# Patient Record
Sex: MALE | Race: WHITE | NOT HISPANIC OR LATINO | URBAN - METROPOLITAN AREA
[De-identification: names, ages, dates, MRNs, and addresses within clinical notes are randomized per-mention and may not be internally consistent; named-entity substitution may affect disease eponyms.]

---

## 2021-04-06 DIAGNOSIS — Z23 ENCOUNTER FOR IMMUNIZATION: ICD-10-CM

## 2021-11-16 ENCOUNTER — IMMUNIZATIONS (OUTPATIENT)
Dept: FAMILY MEDICINE CLINIC | Facility: HOSPITAL | Age: 56
End: 2021-11-16

## 2021-11-16 DIAGNOSIS — Z23 ENCOUNTER FOR IMMUNIZATION: Primary | ICD-10-CM

## 2021-11-16 PROCEDURE — 91300 COVID-19 PFIZER VACC 0.3 ML: CPT

## 2021-11-16 PROCEDURE — 0001A COVID-19 PFIZER VACC 0.3 ML: CPT

## 2022-09-20 ENCOUNTER — APPOINTMENT (OUTPATIENT)
Dept: RADIOLOGY | Age: 57
End: 2022-09-20
Payer: COMMERCIAL

## 2022-09-20 ENCOUNTER — OFFICE VISIT (OUTPATIENT)
Dept: INTERNAL MEDICINE CLINIC | Age: 57
End: 2022-09-20
Payer: COMMERCIAL

## 2022-09-20 VITALS
SYSTOLIC BLOOD PRESSURE: 112 MMHG | OXYGEN SATURATION: 98 % | WEIGHT: 179 LBS | BODY MASS INDEX: 25.62 KG/M2 | DIASTOLIC BLOOD PRESSURE: 74 MMHG | TEMPERATURE: 98 F | HEART RATE: 65 BPM | HEIGHT: 70 IN

## 2022-09-20 DIAGNOSIS — Z00.00 ROUTINE HEALTH MAINTENANCE: ICD-10-CM

## 2022-09-20 DIAGNOSIS — Z12.5 PROSTATE CANCER SCREENING: ICD-10-CM

## 2022-09-20 DIAGNOSIS — Z12.11 ENCOUNTER FOR SCREENING COLONOSCOPY: Primary | ICD-10-CM

## 2022-09-20 DIAGNOSIS — M25.512 CHRONIC LEFT SHOULDER PAIN: ICD-10-CM

## 2022-09-20 DIAGNOSIS — G89.29 CHRONIC LEFT SHOULDER PAIN: ICD-10-CM

## 2022-09-20 DIAGNOSIS — M75.82 ROTATOR CUFF TENDINITIS, LEFT: ICD-10-CM

## 2022-09-20 PROCEDURE — 3725F SCREEN DEPRESSION PERFORMED: CPT | Performed by: INTERNAL MEDICINE

## 2022-09-20 PROCEDURE — 99203 OFFICE O/P NEW LOW 30 MIN: CPT | Performed by: INTERNAL MEDICINE

## 2022-09-20 PROCEDURE — 73030 X-RAY EXAM OF SHOULDER: CPT

## 2022-09-20 RX ORDER — DIPHENOXYLATE HYDROCHLORIDE AND ATROPINE SULFATE 2.5; .025 MG/1; MG/1
1 TABLET ORAL DAILY
COMMUNITY

## 2022-09-20 NOTE — ASSESSMENT & PLAN NOTE
Will request stat left shoulder x-ray    Physical therapy referral and also advised to take over-the-counter Aleve 2 tablet twice a day for next 4 weeks follow-up in 4 weeks

## 2022-09-20 NOTE — ASSESSMENT & PLAN NOTE
X-ray left shoulder  Two Aleve over-the-counter b i d  Physical therapy referral   And also advised to avoid strenuous exercise on left shoulder

## 2022-09-20 NOTE — PROGRESS NOTES
Assessment/Plan:    Physical therapy referral     Diagnoses and all orders for this visit:    Encounter for screening colonoscopy  -     Ambulatory referral for colonoscopy; Future    Chronic left shoulder pain  -     XR shoulder 2+ vw left; Future  -     Ambulatory Referral to Physical Therapy; Future    Rotator cuff tendinitis, left  -     Ambulatory Referral to Physical Therapy; Future    Prostate cancer screening  -     PSA, Total Screen; Future    Routine health maintenance  -     CBC; Future  -     Lipid Panel with Direct LDL reflex; Future  -     Comprehensive metabolic panel; Future    Other orders  -     multivitamin (THERAGRAN) TABS; Take 1 tablet by mouth daily               Subjective:          Patient ID: Sophia Ramon is a 62 y o  male  Patient came to office with a complain of left shoulder pain over the last 2 months  He use rowing machine at home almost on daily basis  Denied any other trauma  Discomfort is more when he needs to elevate his left arm  No tingling numbness in hand/fingers      The following portions of the patient's history were reviewed and updated as appropriate: allergies, current medications, past family history, past medical history, past social history, past surgical history and problem list     Review of Systems   Constitutional: Negative for fatigue and fever  HENT: Negative for congestion, ear discharge, ear pain, postnasal drip, sinus pressure, sore throat, tinnitus and trouble swallowing  Eyes: Negative for discharge, itching and visual disturbance  Respiratory: Negative for cough and shortness of breath  Cardiovascular: Negative for chest pain and palpitations  Gastrointestinal: Negative for abdominal pain, diarrhea, nausea and vomiting  Endocrine: Negative for cold intolerance and polyuria  Genitourinary: Negative for difficulty urinating, dysuria and urgency  Musculoskeletal: Positive for arthralgias  Negative for neck pain     Skin: Negative for rash    Allergic/Immunologic: Negative for environmental allergies  Neurological: Negative for dizziness, weakness and headaches  Psychiatric/Behavioral: Negative for agitation and behavioral problems  The patient is not nervous/anxious  History reviewed  No pertinent past medical history  Current Outpatient Medications:     multivitamin (THERAGRAN) TABS, Take 1 tablet by mouth daily, Disp: , Rfl:     No Known Allergies    Social History   Past Surgical History:   Procedure Laterality Date    TONSILLECTOMY      childhood    WISDOM TOOTH EXTRACTION      2017     Family History   Problem Relation Age of Onset    Cancer Father         Lung Cancer ( 10/2011)       Objective:  /74 (BP Location: Left arm, Patient Position: Sitting, Cuff Size: Large)   Pulse 65   Temp 98 °F (36 7 °C) (Temporal)   Ht 5' 10" (1 778 m)   Wt 81 2 kg (179 lb)   SpO2 98% Comment: room air  BMI 25 68 kg/m²   Body mass index is 25 68 kg/m²  Physical Exam  Nursing note reviewed  Constitutional:       Appearance: He is well-developed  He is not ill-appearing or diaphoretic  HENT:      Head: Normocephalic  Right Ear: Ear canal and external ear normal       Left Ear: Ear canal and external ear normal       Nose: No rhinorrhea  Mouth/Throat:      Pharynx: No posterior oropharyngeal erythema  Eyes:      General: No scleral icterus  Pupils: Pupils are equal, round, and reactive to light  Neck:      Thyroid: No thyromegaly  Trachea: No tracheal deviation  Cardiovascular:      Rate and Rhythm: Normal rate and regular rhythm  Heart sounds: Normal heart sounds  Pulmonary:      Effort: Pulmonary effort is normal  No respiratory distress  Breath sounds: Normal breath sounds  Chest:      Chest wall: No tenderness  Abdominal:      General: Bowel sounds are normal       Palpations: Abdomen is soft  There is no mass  Tenderness: There is no abdominal tenderness  Musculoskeletal:         General: Tenderness present  Cervical back: Normal range of motion and neck supple  Right lower leg: No edema  Left lower leg: No edema  Comments: Tenderness over left acromioclavicular joints noted  Significant limitation of elevation of left shoulder and limitation of abduction noted  Lymphadenopathy:      Cervical: No cervical adenopathy  Skin:     General: Skin is warm  Neurological:      Mental Status: He is alert and oriented to person, place, and time  Cranial Nerves: No cranial nerve deficit  Psychiatric:         Mood and Affect: Mood normal          Behavior: Behavior normal          Thought Content:  Thought content normal          Judgment: Judgment normal

## 2022-09-26 ENCOUNTER — APPOINTMENT (OUTPATIENT)
Dept: LAB | Facility: AMBULARY SURGERY CENTER | Age: 57
End: 2022-09-26
Payer: COMMERCIAL

## 2022-09-26 DIAGNOSIS — Z12.5 PROSTATE CANCER SCREENING: ICD-10-CM

## 2022-09-26 DIAGNOSIS — Z00.00 ROUTINE HEALTH MAINTENANCE: ICD-10-CM

## 2022-09-26 LAB
ALBUMIN SERPL BCP-MCNC: 3.9 G/DL (ref 3.5–5)
ALP SERPL-CCNC: 35 U/L (ref 46–116)
ALT SERPL W P-5'-P-CCNC: 29 U/L (ref 12–78)
ANION GAP SERPL CALCULATED.3IONS-SCNC: 6 MMOL/L (ref 4–13)
AST SERPL W P-5'-P-CCNC: 20 U/L (ref 5–45)
BILIRUB SERPL-MCNC: 0.97 MG/DL (ref 0.2–1)
BUN SERPL-MCNC: 14 MG/DL (ref 5–25)
CALCIUM SERPL-MCNC: 9.1 MG/DL (ref 8.3–10.1)
CHLORIDE SERPL-SCNC: 106 MMOL/L (ref 96–108)
CHOLEST SERPL-MCNC: 157 MG/DL
CO2 SERPL-SCNC: 26 MMOL/L (ref 21–32)
CREAT SERPL-MCNC: 1.03 MG/DL (ref 0.6–1.3)
ERYTHROCYTE [DISTWIDTH] IN BLOOD BY AUTOMATED COUNT: 12.6 % (ref 11.6–15.1)
GFR SERPL CREATININE-BSD FRML MDRD: 80 ML/MIN/1.73SQ M
GLUCOSE P FAST SERPL-MCNC: 95 MG/DL (ref 65–99)
HCT VFR BLD AUTO: 48.1 % (ref 36.5–49.3)
HDLC SERPL-MCNC: 54 MG/DL
HGB BLD-MCNC: 15.9 G/DL (ref 12–17)
LDLC SERPL CALC-MCNC: 85 MG/DL (ref 0–100)
MCH RBC QN AUTO: 29.9 PG (ref 26.8–34.3)
MCHC RBC AUTO-ENTMCNC: 33.1 G/DL (ref 31.4–37.4)
MCV RBC AUTO: 90 FL (ref 82–98)
PLATELET # BLD AUTO: 220 THOUSANDS/UL (ref 149–390)
PMV BLD AUTO: 9.5 FL (ref 8.9–12.7)
POTASSIUM SERPL-SCNC: 3.5 MMOL/L (ref 3.5–5.3)
PROT SERPL-MCNC: 6.9 G/DL (ref 6.4–8.4)
PSA SERPL-MCNC: 1.9 NG/ML (ref 0–4)
RBC # BLD AUTO: 5.32 MILLION/UL (ref 3.88–5.62)
SODIUM SERPL-SCNC: 138 MMOL/L (ref 135–147)
TRIGL SERPL-MCNC: 90 MG/DL
WBC # BLD AUTO: 6.36 THOUSAND/UL (ref 4.31–10.16)

## 2022-09-26 PROCEDURE — 85027 COMPLETE CBC AUTOMATED: CPT

## 2022-09-26 PROCEDURE — 80061 LIPID PANEL: CPT

## 2022-09-26 PROCEDURE — G0103 PSA SCREENING: HCPCS

## 2022-09-26 PROCEDURE — 36415 COLL VENOUS BLD VENIPUNCTURE: CPT

## 2022-09-26 PROCEDURE — 80053 COMPREHEN METABOLIC PANEL: CPT

## 2022-10-04 ENCOUNTER — EVALUATION (OUTPATIENT)
Dept: PHYSICAL THERAPY | Facility: CLINIC | Age: 57
End: 2022-10-04
Payer: COMMERCIAL

## 2022-10-04 DIAGNOSIS — M25.512 CHRONIC LEFT SHOULDER PAIN: ICD-10-CM

## 2022-10-04 DIAGNOSIS — M75.82 ROTATOR CUFF TENDINITIS, LEFT: ICD-10-CM

## 2022-10-04 DIAGNOSIS — M75.42 SUBACROMIAL IMPINGEMENT OF LEFT SHOULDER: Primary | ICD-10-CM

## 2022-10-04 DIAGNOSIS — G89.29 CHRONIC LEFT SHOULDER PAIN: ICD-10-CM

## 2022-10-04 PROCEDURE — 97112 NEUROMUSCULAR REEDUCATION: CPT

## 2022-10-04 PROCEDURE — 97110 THERAPEUTIC EXERCISES: CPT

## 2022-10-04 PROCEDURE — 97161 PT EVAL LOW COMPLEX 20 MIN: CPT

## 2022-10-04 NOTE — PROGRESS NOTES
PT Evaluation     Today's date: 10/4/2022  Patient name: Valarie Chapman  : 1965  MRN: [id number]  Referring provider: Lisbet Morales MD  Dx:   Encounter Diagnosis     ICD-10-CM    1  Subacromial impingement of left shoulder  M75 42    2  Rotator cuff tendinitis, left  M75 82 Ambulatory Referral to Physical Therapy   3  Chronic left shoulder pain  M25 512 Ambulatory Referral to Physical Therapy    G89 29        Start Time: 1700  Stop Time: 1745  Total time in clinic (min): 45 minutes    Assessment  Assessment details: Valarie Chapman is a pleasant 62 y o  male who presents with L shoulder pain  Pt has positive test cluster for subacromial impingement including infraspinatus test, hawkin's, and painful arc, as well as other tests that decrease the subacromial space  Pt has the common referral pattern at the lateral shoulder for impingement as well as slight scapular dyskinesis contributing to faulty mechanics likely causing further irritation  Pt did have minimal and moderately painful lift off test and weakness with IR which leads to speculation of RTC pathology, however this will be further investigated once symptom irritation levels decrease  Pt consented to and tolerated Gr  V thoracic mobilization well reporting a decrease in discomfort following  Pt was given an HEP focused on thoracic extension and scapular motor control training and instructed to stop performing if symptoms increased  The patient's greatest concerns are the pain he is experiencing, worry over not knowing what's wrong, concern at no signs of improvement and fear of not being able to keep active  No further referral appears necessary at this time based upon examination results  Primary movement impairment diagnosis of subacromial impingement secondary to scapular dyskinesis resulting in pathoanatomical symptoms of pain and weakness and limiting his ability to exercise or recreation      Primary Impairments:  1) faulty scapular motor control  2) GH hypomobility    Etiologic factors include none recalled by the patient  Impairments: abnormal or restricted ROM, activity intolerance, impaired physical strength, lacks appropriate home exercise program, pain with function, scapular dyskinesis, poor posture  and poor body mechanics    Symptom irritability: moderateUnderstanding of Dx/Px/POC: good   Prognosis: good  Prognosis details: Positive prognostic indicators include positive attitude toward recovery, good understanding of diagnosis and treatment plan options, acuity of symptoms, absence of peripheralization and absence of observed red flags  Negative prognostic indicators include chronicity of symptoms  Goals  Short Term Goals   Pt will improve UE ROM by 5-10 degrees in all deficient planes order to improve function with reaching and ADLs  Pt will improve UE strength by 1/2 grade in all deficient muscle groups in order to improve function  Pt will decrease pain to 3/10 at its worst  Pt will be independent with a basic HEP    Long Term Goals  Pt will achieve a FOTO score of 71  Pt will improve UE ROM to [facility] in order to maximize function  Pt will improve UE strength to 5/5 in all deficient muscle groups in order to maximize function with ADLs  Pt will be able to manage symptoms independently  Pt will be independent with an extensive HEP      Plan  Patient would benefit from: skilled physical therapy  Referral necessary: No  Planned modality interventions: Modalities PRN    Planned therapy interventions: activity modification, manual therapy, neuromuscular re-education, patient education, therapeutic activities, therapeutic exercise, graded activity, home exercise program, behavior modification and self care  Frequency: 2x week  Duration in weeks: 12  Treatment plan discussed with: patient        Subjective Evaluation    History of Present Illness  Mechanism of injury: History of Current Injury: Pt reports L shoulder pain that began around memorial day weekend when he was swimming in the ocean and felt something in his shoulder when reaching out  Pt denies a pop or previous issues with shoulder or neck  Pt reports he uses a rowing machine everyday for the past 7 years nearly everyday as well as does push ups however cannot anymore  Pain location/Descriptors: stabbing pain in the lateral shoulder  Aggravating factors: putting shirt/coat on, push ups, abduction  Easing factors: nothing  AM/PM pattern: Sleeping is worse (sleeps on his stomach), morning is more painful  Imaging: Xray: unremarkable  Special Questions: Pt denies N/T, feeling of instability but does report weakness  Pt denies pain that wakes him up   Patient goals: Pt wants to be able to workout and use his arm without limitation  Occupation: educational supervisor             Not a recurrent problem   Quality of life: good    Pain  Current pain ratin  At best pain ratin  At worst pain ratin    Social Support  Stairs in house: yes   Lives in: multiple-level home  Lives with: adult children    Employment status: working  Hand dominance: right          Objective     Postural Observations  Seated posture: fair  Standing posture: good        Tenderness     Left Shoulder   No tenderness     Active Range of Motion   Left Shoulder   Flexion: WFL  Abduction: 130 degrees with pain  External rotation BTH: T4   Internal rotation BTB: T9 with pain    Right Shoulder   External rotation BTH: T5   Internal rotation BTB: T8     Passive Range of Motion   Left Shoulder   Flexion: Left shoulder passive forward flexion: pain at end range   WFL  Abduction: 130 degrees with pain  External rotation 90°: [facility]  Internal rotation 90°: 45 degrees with pain    Scapular Mobility     Additional Scapular Mobility Details  Minor lack of upward rotation w/ abduction and slight dumping on lowering    Joint Play   Left Shoulder  Joints within functional limits are the anterior capsule and inferior capsule  Hypomobile in the posterior capsule  Additional Joint Play Details  Mid-upper thoracic spine slightly hypomobile    Strength/Myotome Testing   Cervical Spine     Left   Interossei strength (t1): 5    Right   Interossei strength (t1): 5    Left Shoulder     Planes of Motion   Flexion: 5   Extension: 4+   Abduction: 4+   External rotation at 0°: 4+   Internal rotation at 0°: 4     Isolated Muscles   Lower trapezius: 4-   Middle trapezius: 3+     Right Shoulder     Planes of Motion   Flexion: 5   Extension: 5   Abduction: 5   External rotation at 0°: 5   Internal rotation at 0°: 5     Left Elbow   Flexion: 5  Extension: 4+    Right Elbow   Flexion: 5  Extension: 5    Left Wrist/Hand   Wrist extension: 5  Wrist flexion: 5  Radial deviation: 5  Ulnar deviation: 5  Thumb extension: 5    Right Wrist/Hand   Wrist extension: 5  Wrist flexion: 5  Radial deviation: 5  Ulnar deviation: 5  Thumb extension: 5    Tests   Cervical   Negative vertical compression  Left   Negative Spurling's Test A and Spurling's Test B  Right   Negative Spurling's Test A  Left Shoulder   Positive active compression ([place]), empty can, Hawkin's, lift-off, Neer's, painful arc, ULTT3 and scapular assistance test positive  Negative anterior slide, apprehension, belly press, drop arm, full can, Speed's, ULTT1, ULTT4, anterior slide  and bicep load   Lumbar   Negative vertical compression       Additional Tests Details  Pt able to get minor lift with lift off test however painful and unable to sustain      Flowsheet Rows    Flowsheet Row Most Recent Value   PT/OT G-Codes    Current Score 46   Projected Score 71             Precautions: L shoulder impingement       10/4            Manuals             Supine thoracic Gr  V HVLA mobilization EM            Posterior capsule mobs                                       Neuro Re-Ed             Prone retraction w/ shoulder extension 20x5" HEP            Prone I Weight NV Prone T 5x PT facilitation                                                                 Ther Ex             Thoracic extension w/ towel roll 20x3" HEP            Rows  3x15 GTB HEP            Low trap ER (no moneys)             Straight arm extension             pec stretch doorway                                                    Ther Activity                                       Gait Training                                       Modalities                                       Assessment IE, POC, Prognosis            Education HEP, POC, Prognosis

## 2022-10-11 ENCOUNTER — OFFICE VISIT (OUTPATIENT)
Dept: PHYSICAL THERAPY | Facility: CLINIC | Age: 57
End: 2022-10-11
Payer: COMMERCIAL

## 2022-10-11 DIAGNOSIS — G89.29 CHRONIC LEFT SHOULDER PAIN: ICD-10-CM

## 2022-10-11 DIAGNOSIS — M75.42 SUBACROMIAL IMPINGEMENT OF LEFT SHOULDER: Primary | ICD-10-CM

## 2022-10-11 DIAGNOSIS — M75.82 ROTATOR CUFF TENDINITIS, LEFT: ICD-10-CM

## 2022-10-11 DIAGNOSIS — M25.512 CHRONIC LEFT SHOULDER PAIN: ICD-10-CM

## 2022-10-11 PROCEDURE — 97112 NEUROMUSCULAR REEDUCATION: CPT

## 2022-10-11 PROCEDURE — 97110 THERAPEUTIC EXERCISES: CPT

## 2022-10-11 PROCEDURE — 97140 MANUAL THERAPY 1/> REGIONS: CPT

## 2022-10-11 NOTE — PROGRESS NOTES
Daily Note     Today's date: 10/11/2022  Patient name: Kaela Tolentino  : 1965  MRN: [id number]  Referring provider: Rebeca Calderon MD  Dx:   Encounter Diagnosis     ICD-10-CM    1  Subacromial impingement of left shoulder  M75 42    2  Rotator cuff tendinitis, left  M75 82    3  Chronic left shoulder pain  M25 512     G89 29        Start Time: 1652  Stop Time: 1735  Total time in clinic (min): 43 minutes    Subjective: Pt reported he had a slight improvement in symptoms for a few days following the evaluation however symptoms returned to baseline and things like putting a shirt or jacket on is still painful  Objective: See treatment diary below      Assessment: Pt had symptom improvement following Gr  V mobilization again however still had minor discomfort with lowering abduction AROM  Minor cueing for scapular mechanics was needed for prone exercises  Pt had continually less discomfort with abduction with further scapular strengthening  Pt reported feeling pretty good at the end of the session and HEP was updated  Patient demonstrated fatigue post treatment, exhibited good technique with therapeutic exercises and would benefit from continued PT      Plan: Continue per plan of care        Precautions: L shoulder impingement       10/4 10/11           Manuals             Supine thoracic Gr  V HVLA mobilization EM EM           Posterior capsule mobs  2x40 Gr  III           IR stretch  2'                        Neuro Re-Ed             Prone retraction w/ shoulder extension 20x5" HEP 20x5"           Prone I Weight NV 3x10 2#           Prone T 5x PT facilitation  2x10 PT facilitation           Abduction w/ scapular facilitation  5x D/C                                                  Ther Ex             Thoracic extension w/ towel roll 20x3" HEP 20x3"            Rows  3x15 GTB HEP 3x15 BlkTB           Low trap ER (no moneys)  3x10 YTB HEP           Straight arm extension             pec stretch doorway AAROM abduction w/ eccentric lowering  2x10 HEP                                     Ther Activity                                       Gait Training                                       Modalities                                       Assessment IE, POC, Prognosis            Education HEP, POC, Prognosis UHEP

## 2022-10-18 ENCOUNTER — OFFICE VISIT (OUTPATIENT)
Dept: PHYSICAL THERAPY | Facility: CLINIC | Age: 57
End: 2022-10-18
Payer: COMMERCIAL

## 2022-10-18 DIAGNOSIS — M75.42 SUBACROMIAL IMPINGEMENT OF LEFT SHOULDER: Primary | ICD-10-CM

## 2022-10-18 DIAGNOSIS — M25.512 CHRONIC LEFT SHOULDER PAIN: ICD-10-CM

## 2022-10-18 DIAGNOSIS — M75.82 ROTATOR CUFF TENDINITIS, LEFT: ICD-10-CM

## 2022-10-18 DIAGNOSIS — G89.29 CHRONIC LEFT SHOULDER PAIN: ICD-10-CM

## 2022-10-18 PROCEDURE — 97112 NEUROMUSCULAR REEDUCATION: CPT

## 2022-10-18 PROCEDURE — 97110 THERAPEUTIC EXERCISES: CPT

## 2022-10-18 NOTE — PROGRESS NOTES
Daily Note     Today's date: 10/18/2022  Patient name: Elijah Urias  : 1965  MRN: [id number]  Referring provider: Ravinder Pennington MD  Dx:   Encounter Diagnosis     ICD-10-CM    1  Subacromial impingement of left shoulder  M75 42    2  Rotator cuff tendinitis, left  M75 82    3  Chronic left shoulder pain  M25 512     G89 29        Start Time: 1700  Stop Time: 1740  Total time in clinic (min): 40 minutes     Subjective: Patient notes slight improvements with his symptoms  He continues to row as tolerated  Objective: See treatment diary below    Assessment: Patient demonstrated fatigue post treatment, exhibited good technique with therapeutic exercises and would benefit from continued PT  Plan: Continue per plan of care        Precautions: L shoulder impingement     10/4 10/11 10/18          Manuals             Supine thoracic Gr  V HVLA mobilization EM EM           Posterior capsule mobs  2x40 Gr  III           IR stretch  2' 4'                       Neuro Re-Ed             Prone retraction w/ shoulder extension 20x5" HEP 20x5" 20x5"          Prone I Weight NV 3x10 2# 2# 3x10          Prone T 5x PT facilitation  2x10 PT facilitation 2x10 PT  faciltation          Abduction w/ scapular facilitation  5x D/C                                                  Ther Ex             Thoracic extension w/ towel roll 20x3" HEP 20x3"  20x3"          Rows  3x15 GTB HEP 3x15 BlkTB Black  3x15          Low trap ER (no moneys)  3x10 YTB HEP YTB  3x10          Straight arm extension             pec stretch doorway             AAROM abduction w/ eccentric lowering  2x10 HEP 2x10                                    Ther Activity                                       Gait Training                                       Modalities                                       Assessment IE, POC, Prognosis            Education HEP, POC, Prognosis UHEP Marc Garrido)

## 2022-10-27 ENCOUNTER — OFFICE VISIT (OUTPATIENT)
Dept: INTERNAL MEDICINE CLINIC | Age: 57
End: 2022-10-27
Payer: COMMERCIAL

## 2022-10-27 ENCOUNTER — OFFICE VISIT (OUTPATIENT)
Dept: PHYSICAL THERAPY | Facility: CLINIC | Age: 57
End: 2022-10-27
Payer: COMMERCIAL

## 2022-10-27 VITALS
SYSTOLIC BLOOD PRESSURE: 110 MMHG | BODY MASS INDEX: 25.62 KG/M2 | DIASTOLIC BLOOD PRESSURE: 70 MMHG | WEIGHT: 179 LBS | TEMPERATURE: 98 F | HEART RATE: 55 BPM | HEIGHT: 70 IN | OXYGEN SATURATION: 98 %

## 2022-10-27 DIAGNOSIS — G89.29 CHRONIC LEFT SHOULDER PAIN: ICD-10-CM

## 2022-10-27 DIAGNOSIS — M75.82 ROTATOR CUFF TENDINITIS, LEFT: ICD-10-CM

## 2022-10-27 DIAGNOSIS — M75.82 ROTATOR CUFF TENDINITIS, LEFT: Primary | ICD-10-CM

## 2022-10-27 DIAGNOSIS — M75.42 SUBACROMIAL IMPINGEMENT OF LEFT SHOULDER: Primary | ICD-10-CM

## 2022-10-27 DIAGNOSIS — M25.512 CHRONIC LEFT SHOULDER PAIN: ICD-10-CM

## 2022-10-27 PROCEDURE — 97110 THERAPEUTIC EXERCISES: CPT

## 2022-10-27 PROCEDURE — 99213 OFFICE O/P EST LOW 20 MIN: CPT | Performed by: INTERNAL MEDICINE

## 2022-10-27 PROCEDURE — 97112 NEUROMUSCULAR REEDUCATION: CPT

## 2022-10-27 NOTE — PROGRESS NOTES
Daily Note     Today's date: 10/27/2022  Patient name: Kemi Soler  : 1965  MRN: [id number]  Referring provider: Renetta Murray MD  Dx:   Encounter Diagnosis     ICD-10-CM    1  Subacromial impingement of left shoulder  M75 42    2  Rotator cuff tendinitis, left  M75 82    3  Chronic left shoulder pain  M25 512     G89 29                   Subjective: Pt reports he has made enough progress to believe something is working however it hasn't changed much since the last session  Objective: See treatment diary below      Assessment: Pt continues to demonstrate lack of posterior humeral glide during abduction movements which is creating anterior translation and pain  Pt had less pain with PT facilitation as well as with cueing to depress and retract scapula  Pt had improved tolerance to prone T following posterior capsule stretch as well  Much of pt's session was spent explaining/educating pt on the mechanics of the shoulder and how to try and fix his mechanics when rowing at home  Patient demonstrated fatigue post treatment, exhibited good technique with therapeutic exercises and would benefit from continued PT      Plan: Continue per plan of care  Precautions: L shoulder impingement     10/4 10/11 10/18 10/27         Manuals             Supine thoracic Gr  V HVLA mobilization EM EM           Posterior capsule mobs  2x40 Gr  III  EM Gr   III 3x30         IR stretch  2' 4'                       Neuro Re-Ed             Prone retraction w/ shoulder extension 20x5" HEP 20x5" 20x5"          Prone I Weight NV 3x10 2# 2# 3x10 2# 3x10         Prone T 5x PT facilitation  2x10 PT facilitation 2x10 PT  faciltation 2x10 PT facil         Abduction w/ scapular facilitation  5x D/C  10x, 10x starting in scaption and moving to abd                                                Ther Ex             Thoracic extension w/ towel roll 20x3" HEP 20x3"  20x3"          Rows  3x15 GTB HEP 3x15 BlkTB Black  3x15          Low trap ER (no moneys)  3x10 YTB HEP YTB  3x10 YTB 3x10         Straight arm extension             pec stretch doorway             AAROM abduction w/ eccentric lowering  2x10 HEP 2x10          Horizontal B/L abduction     3x10 GTB          sidelying posterior capsule stretch    4x15" HEP         Seated rows (at UE bike)    2x10 25#         Straight arm extension focus on retraction    RTB 3x10 HEP         Ther Activity                                       Gait Training                                       Modalities                                       Assessment IE, POC, Prognosis            Education HEP, POC, Prognosis UHEP

## 2022-10-27 NOTE — PROGRESS NOTES
Assessment/Plan:    Rotator cuff tendinitis, left  X-ray left shoulder reviewed which is unremarkable  Continue with physical therapy  Encounter for screening colonoscopy  Patient is scheduled for colonoscopy  Diagnoses and all orders for this visit:    Rotator cuff tendinitis, left          BMI Counseling: Body mass index is 25 68 kg/m²  The BMI is above normal  Nutrition recommendations include decreasing portion sizes, encouraging healthy choices of fruits and vegetables, decreasing fast food intake, consuming healthier snacks and limiting drinks that contain sugar  Exercise recommendations include moderate physical activity 150 minutes/week and exercising 3-5 times per week  No pharmacotherapy was ordered  Rationale for BMI follow-up plan is due to patient being overweight or obese  Subjective:          Patient ID: Severo Lagos is a 62 y o  male  PATIENT IS HERE FOR REGULAR FOLLOW-UP   ALSO HAVE BLOOD WORK DONE WOULD LIKE TO DISCUSS RESULTS  PRESENTLY ALSO UNDERGOING PHYSICAL THERAPY FOR LEFT SHOULDER PAIN AND IS GETTING BETTER  The following portions of the patient's history were reviewed and updated as appropriate: allergies, current medications, past family history, past medical history, past social history, past surgical history and problem list     Review of Systems   Constitutional: Negative for fatigue and fever  HENT: Negative for congestion, ear discharge, ear pain, postnasal drip, sinus pressure, sore throat, tinnitus and trouble swallowing  Eyes: Negative for discharge, itching and visual disturbance  Respiratory: Negative for cough and shortness of breath  Cardiovascular: Negative for chest pain and palpitations  Gastrointestinal: Negative for abdominal pain, diarrhea, nausea and vomiting  Endocrine: Negative for cold intolerance and polyuria  Genitourinary: Negative for difficulty urinating, dysuria and urgency     Musculoskeletal: Positive for arthralgias  Negative for neck pain  Skin: Negative for rash  Allergic/Immunologic: Negative for environmental allergies  Neurological: Negative for dizziness, weakness and headaches  Psychiatric/Behavioral: Negative for agitation and behavioral problems  The patient is not nervous/anxious  History reviewed  No pertinent past medical history  Current Outpatient Medications:   •  multivitamin (THERAGRAN) TABS, Take 1 tablet by mouth daily, Disp: , Rfl:     No Known Allergies    Social History   Past Surgical History:   Procedure Laterality Date   • TONSILLECTOMY      childhood   • WISDOM TOOTH EXTRACTION           Family History   Problem Relation Age of Onset   • Cancer Father         Lung Cancer ( 10/2011)       Objective:  /70 (BP Location: Left arm, Patient Position: Sitting, Cuff Size: Adult)   Pulse 55   Temp 98 °F (36 7 °C) (Temporal)   Ht 5' 10" (1 778 m)   Wt 81 2 kg (179 lb)   SpO2 98%   BMI 25 68 kg/m²   Body mass index is 25 68 kg/m²  Physical Exam  Constitutional:       Appearance: He is well-developed  He is not ill-appearing or diaphoretic  HENT:      Head: Normocephalic  Right Ear: External ear normal       Left Ear: External ear normal       Nose: No rhinorrhea  Mouth/Throat:      Pharynx: No posterior oropharyngeal erythema  Eyes:      General: No scleral icterus  Pupils: Pupils are equal, round, and reactive to light  Neck:      Thyroid: No thyromegaly  Trachea: No tracheal deviation  Cardiovascular:      Rate and Rhythm: Normal rate and regular rhythm  Heart sounds: Normal heart sounds  No murmur heard  Pulmonary:      Effort: Pulmonary effort is normal  No respiratory distress  Breath sounds: Normal breath sounds  Chest:      Chest wall: No tenderness  Abdominal:      General: Bowel sounds are normal       Palpations: Abdomen is soft  There is no mass  Tenderness: There is no abdominal tenderness  Musculoskeletal:      Cervical back: Normal range of motion and neck supple  Right lower leg: No edema  Left lower leg: No edema  Lymphadenopathy:      Cervical: No cervical adenopathy  Skin:     General: Skin is warm  Neurological:      Mental Status: He is alert and oriented to person, place, and time  Cranial Nerves: No cranial nerve deficit  Psychiatric:         Mood and Affect: Mood normal          Behavior: Behavior normal          Thought Content:  Thought content normal

## 2022-11-03 ENCOUNTER — OFFICE VISIT (OUTPATIENT)
Dept: PHYSICAL THERAPY | Facility: CLINIC | Age: 57
End: 2022-11-03

## 2022-11-03 DIAGNOSIS — M75.42 SUBACROMIAL IMPINGEMENT OF LEFT SHOULDER: Primary | ICD-10-CM

## 2022-11-03 DIAGNOSIS — G89.29 CHRONIC LEFT SHOULDER PAIN: ICD-10-CM

## 2022-11-03 DIAGNOSIS — M25.512 CHRONIC LEFT SHOULDER PAIN: ICD-10-CM

## 2022-11-03 DIAGNOSIS — M75.82 ROTATOR CUFF TENDINITIS, LEFT: ICD-10-CM

## 2022-11-03 NOTE — PROGRESS NOTES
Daily Note     Today's date: 11/3/2022  Patient name: Mary Carmen Sutton  : 1965  MRN: [id number]  Referring provider: Yony Milan MD  Dx:   Encounter Diagnosis     ICD-10-CM    1  Subacromial impingement of left shoulder  M75 42    2  Rotator cuff tendinitis, left  M75 82    3  Chronic left shoulder pain  M25 512     G89 29        Start Time: 1653  Stop Time: 1724  Total time in clinic (min): 31 minutes    Subjective: Pt reports continued improvement however still notable discomfort  Objective: See treatment diary below      Assessment: Pt showed the PT a video of him using his row machine which demonstrated B/L shoulder shrugging and lack of scapular retraction with anteiror humeral translation  Pt was educated on how to try and change/modify his form to avoid repeatedly performing faulty mechanics  Pt had less pain with forward flexion following posterior capsule mobs however abduction was still painful and limited  Following Prone exercises pt was able to lift arm into abduction fully with only minor discomfort  Patient demonstrated fatigue post treatment, exhibited good technique with therapeutic exercises and would benefit from continued PT      Plan: Continue per plan of care  Precautions: L shoulder impingement     10/4 10/11 10/18 10/27         Manuals             Supine thoracic Gr  V HVLA mobilization EM EM           Posterior capsule mobs  2x40 Gr  III  EM Gr  III 3x30 EM Gr   III 2x50        IR stretch  2' 4'                       Neuro Re-Ed             Prone retraction w/ shoulder extension 20x5" HEP 20x5" 20x5"          Prone I Weight NV 3x10 2# 2# 3x10 2# 3x10 2# 3x12        Prone T 5x PT facilitation  2x10 PT facilitation 2x10 PT  faciltation 2x10 PT facil Pball B/L 2x12        Abduction w/ scapular facilitation  5x D/C  10x, 10x starting in scaption and moving to abd 10x, 10x                                               Ther Ex             Thoracic extension w/ towel roll 20x3" HEP 20x3"  20x3"  NV        Rows  3x15 GTB HEP 3x15 BlkTB Black  3x15          Low trap ER (no moneys)  3x10 YTB HEP YTB  3x10 YTB 3x10 dec range 3x20 YTB         Straight arm extension             pec stretch doorway             AAROM abduction w/ eccentric lowering  2x10 HEP 2x10          Horizontal B/L abduction     3x10 GTB  3x15 GTB         sidelying posterior capsule stretch    4x15" HEP         Seated rows (at UE bike)    2x10 25#          Straight arm extension focus on retraction    RTB 3x10 HEP  RTB 3x10        Ther Activity                                       Gait Training                                       Modalities                                       Assessment IE, POC, Prognosis            Education HEP, POC, Prognosis UHEP

## 2022-11-08 ENCOUNTER — OFFICE VISIT (OUTPATIENT)
Dept: PHYSICAL THERAPY | Facility: CLINIC | Age: 57
End: 2022-11-08

## 2022-11-08 DIAGNOSIS — M25.512 CHRONIC LEFT SHOULDER PAIN: ICD-10-CM

## 2022-11-08 DIAGNOSIS — M75.82 ROTATOR CUFF TENDINITIS, LEFT: ICD-10-CM

## 2022-11-08 DIAGNOSIS — M75.42 SUBACROMIAL IMPINGEMENT OF LEFT SHOULDER: Primary | ICD-10-CM

## 2022-11-08 DIAGNOSIS — G89.29 CHRONIC LEFT SHOULDER PAIN: ICD-10-CM

## 2022-11-08 NOTE — PROGRESS NOTES
Daily Note     Today's date: 2022  Patient name: Ciarra Reed  : 1965  MRN: [id number]  Referring provider: Zenaida Deshpande MD  Dx:   Encounter Diagnosis     ICD-10-CM    1  Subacromial impingement of left shoulder  M75 42    2  Rotator cuff tendinitis, left  M75 82    3  Chronic left shoulder pain  M25 512     G89 29        Start Time: 1700  Stop Time: 1735  Total time in clinic (min): 35 minutes    Subjective: Patient notes shoulder pain with L AROM abduction  Continues to row as tolerated  Objective: See treatment diary below    Assessment: Patient's PROM was limited by pain prior to manual intervention  Post manual stretching patient had less pain with AROM abduction and greater AROM  Patient demonstrated fatigue post treatment, exhibited good technique with therapeutic exercises and would benefit from continued PT  Plan: Continue per plan of care  Precautions: L shoulder impingement     10/4 10/11 10/18 10/27  11/8     Manuals           Supine thoracic Gr  V HVLA mobilization EM EM         Posterior capsule mobs  2x40 Gr  III  EM Gr  III 3x30 EM Gr   III 2x50 PROM 10'     IR stretch  2' 4'                   Neuro Re-Ed           Prone retraction w/ shoulder extension 20x5" HEP 20x5" 20x5"        Prone I Weight NV 3x10 2# 2# 3x10 2# 3x10 2# 3x12 2#  3x12     Prone T 5x PT facilitation  2x10 PT facilitation 2x10 PT  faciltation 2x10 PT facil Pball B/L 2x12 Pball  B/L  3x12     Abduction w/ scapular facilitation  5x D/C  10x, 10x starting in scaption and moving to abd 10x, 10x 10x  10x                                      Ther Ex           Thoracic extension w/ towel roll 20x3" HEP 20x3"  20x3"  NV      Rows  3x15 GTB HEP 3x15 BlkTB Black  3x15        Low trap ER (no moneys)  3x10 YTB HEP YTB  3x10 YTB 3x10 dec range 3x20 YTB  YTB  3x20     Straight arm extension           pec stretch doorway           AAROM abduction w/ eccentric lowering  2x10 HEP 2x10        Horizontal B/L abduction 3x10 GTB  3x15 GTB  3x15  GTB     sidelying posterior capsule stretch    4x15" HEP       Seated rows (at UE bike)    2x10 25#        Straight arm extension focus on retraction    RTB 3x10 HEP  RTB 3x10 RTB  3x10     Ther Activity                                 Gait Training                                 Modalities                                 Assessment IE, POC, Prognosis          Education HEP, POC, Prognosis UHEP

## 2022-11-15 ENCOUNTER — OFFICE VISIT (OUTPATIENT)
Dept: PHYSICAL THERAPY | Facility: CLINIC | Age: 57
End: 2022-11-15

## 2022-11-15 DIAGNOSIS — M25.512 CHRONIC LEFT SHOULDER PAIN: ICD-10-CM

## 2022-11-15 DIAGNOSIS — M75.42 SUBACROMIAL IMPINGEMENT OF LEFT SHOULDER: Primary | ICD-10-CM

## 2022-11-15 DIAGNOSIS — M75.82 ROTATOR CUFF TENDINITIS, LEFT: ICD-10-CM

## 2022-11-15 DIAGNOSIS — G89.29 CHRONIC LEFT SHOULDER PAIN: ICD-10-CM

## 2022-11-22 ENCOUNTER — APPOINTMENT (OUTPATIENT)
Dept: PHYSICAL THERAPY | Facility: CLINIC | Age: 57
End: 2022-11-22

## 2022-11-29 ENCOUNTER — OFFICE VISIT (OUTPATIENT)
Dept: PHYSICAL THERAPY | Facility: CLINIC | Age: 57
End: 2022-11-29

## 2022-11-29 DIAGNOSIS — M75.42 SUBACROMIAL IMPINGEMENT OF LEFT SHOULDER: Primary | ICD-10-CM

## 2022-11-29 DIAGNOSIS — M25.512 CHRONIC LEFT SHOULDER PAIN: ICD-10-CM

## 2022-11-29 DIAGNOSIS — G89.29 CHRONIC LEFT SHOULDER PAIN: ICD-10-CM

## 2022-11-29 DIAGNOSIS — M75.82 ROTATOR CUFF TENDINITIS, LEFT: ICD-10-CM

## 2022-11-29 NOTE — PROGRESS NOTES
Daily Note     Today's date: 2022  Patient name: Katelynn Mckenzie  : 1965  MRN: [id number]  Referring provider: Drew Thompson MD  Dx:   Encounter Diagnosis     ICD-10-CM    1  Subacromial impingement of left shoulder  M75 42       2  Rotator cuff tendinitis, left  M75 82       3  Chronic left shoulder pain  M25 512     G89 29           Start Time: 1655  Stop Time: 1730  Total time in clinic (min): 35 minutes    Subjective: Pt reports he has slacked off on his exercises a bit however still has had less frequency and intensity of his discomfort  Objective: See treatment diary below      Assessment: Pt continues to not be able to tolerate posterior capsule self stretching but has considerable IR restriction still, and extreme restriction on the R UE  Pt continues to struggle with scapular activation which greatly improves his tolerance for interventions  Pt should continue to focus on scapular stabilization and IR stretching  Patient demonstrated fatigue post treatment, exhibited good technique with therapeutic exercises and would benefit from continued PT      Plan: Continue per plan of care  Precautions: L shoulder impingement     10/4 10/11 10/18 10/27 11/3 11/8 11/15 11/29    Manuals            Supine thoracic Gr  V HVLA mobilization EM EM          Posterior capsule mobs  2x40 Gr  III  EM Gr  III 3x30 EM Gr  III 2x50 PROM 10' PROM 10' 2x30 EM Gr   III    IR stretch  2' 4'     4'    Scapular mobs        NV*    Neuro Re-Ed            Prone retraction w/ shoulder extension 20x5" HEP 20x5" 20x5"         Prone I Weight NV 3x10 2# 2# 3x10 2# 3x10 2# 3x12 2#  3x12 2#  3x12 2# 3x12    Prone T 5x PT facilitation  2x10 PT facilitation 2x10 PT  faciltation 2x10 PT facil Pball B/L 2x12 Pball  B/L  3x12 Pball  B/L  3x12 Pball B/L 3x12    Abduction w/ scapular facilitation  5x D/C  10x, 10x starting in scaption and moving to abd 10x, 10x 10x  10x 10x  10x                                         Ther Ex Thoracic extension w/ towel roll 20x3" HEP 20x3"  20x3"  NV       Rows  3x15 GTB HEP 3x15 BlkTB Black  3x15         Low trap ER (no moneys)  3x10 YTB HEP YTB  3x10 YTB 3x10 dec range 3x20 YTB  YTB  3x20 YTB  3x20 YTB 3x20    Straight arm extension            pec stretch doorway            AAROM abduction w/ eccentric lowering  2x10 HEP 2x10         Horizontal B/L abduction     3x10 GTB  3x15 GTB  3x15  GTB 3x15  GTB 3x15 GTB    sidelying posterior capsule stretch    4x15" HEP        Seated rows (at UE bike)    2x10 25#         Straight arm extension focus on retraction    RTB 3x10 HEP  RTB 3x10 RTB  3x10 RTB  3x10  RTB 3x12    resisted ER        GTB 15x, RTB 2x15    Ther Activity                                    Gait Training                                    Modalities                                    Assessment IE, POC, Prognosis           Education HEP, POC, Prognosis UHEP

## 2022-12-06 ENCOUNTER — OFFICE VISIT (OUTPATIENT)
Dept: PHYSICAL THERAPY | Facility: CLINIC | Age: 57
End: 2022-12-06

## 2022-12-06 DIAGNOSIS — M75.42 SUBACROMIAL IMPINGEMENT OF LEFT SHOULDER: Primary | ICD-10-CM

## 2022-12-06 DIAGNOSIS — G89.29 CHRONIC LEFT SHOULDER PAIN: ICD-10-CM

## 2022-12-06 DIAGNOSIS — M25.512 CHRONIC LEFT SHOULDER PAIN: ICD-10-CM

## 2022-12-06 DIAGNOSIS — M75.82 ROTATOR CUFF TENDINITIS, LEFT: ICD-10-CM

## 2022-12-13 ENCOUNTER — OFFICE VISIT (OUTPATIENT)
Dept: PHYSICAL THERAPY | Facility: CLINIC | Age: 57
End: 2022-12-13

## 2022-12-13 DIAGNOSIS — M75.42 SUBACROMIAL IMPINGEMENT OF LEFT SHOULDER: Primary | ICD-10-CM

## 2022-12-13 DIAGNOSIS — G89.29 CHRONIC LEFT SHOULDER PAIN: ICD-10-CM

## 2022-12-13 DIAGNOSIS — M75.82 ROTATOR CUFF TENDINITIS, LEFT: ICD-10-CM

## 2022-12-13 DIAGNOSIS — M25.512 CHRONIC LEFT SHOULDER PAIN: ICD-10-CM

## 2022-12-13 NOTE — PROGRESS NOTES
Daily Note     Today's date: 2022  Patient name: Marylee Dine  : 1965  MRN: [id number]  Referring provider: Hiral Saunders MD  Dx:   Encounter Diagnosis     ICD-10-CM    1  Subacromial impingement of left shoulder  M75 42       2  Rotator cuff tendinitis, left  M75 82       3  Chronic left shoulder pain  M25 512     G89 29           Start Time: 1700  Stop Time: 1726  Total time in clinic (min): 26 minutes    Subjective: Pt reports decreased intensity and frequency of symptoms  Pt rowed 4 days without pain and wasn't sore the next morning  Pt reports he thinks he can be done with PT next visit  Objective: See treatment diary below      Assessment: Pt was able to tolerate functional IR stretch without pain and it was added to his HEP  Pt still needed facilitation for prone T exercise still and when attempting on the Pball pt was too fatigued and had discomfort  Pt had no pain with active abduction until the end of the session  Patient demonstrated fatigue post treatment and exhibited good technique with therapeutic exercises      Plan: Progress note during next visit  Potential discharge next visit  Precautions: L shoulder impingement     10/4 10/11 10/18 10/27 11/3 11/8 11/15 11/29 12/6 12/13   Manuals             Supine thoracic Gr  V HVLA mobilization EM EM           Posterior capsule mobs  2x40 Gr  III  EM Gr  III 3x30 EM Gr  III 2x50 PROM 10' PROM 10' 2x30 EM Gr   III 3x30 EM Gr  III, cross body 10x    IR stretch  2' 4'     4' 3'    Scapular mobs        NV* 3' sidelying     Neuro Re-Ed             Prone retraction w/ shoulder extension 20x5" HEP 20x5" 20x5"      10x5"    Prone I Weight NV 3x10 2# 2# 3x10 2# 3x10 2# 3x12 2#  3x12 2#  3x12 2# 3x12 Pball 2# 3x12    Prone T 5x PT facilitation  2x10 PT facilitation 2x10 PT  faciltation 2x10 PT facil Pball B/L 2x12 Pball  B/L  3x12 Pball  B/L  3x12 Pball B/L 3x12 Pball B/L 3x12 1# On table 15x PT facilitation, Pball 10x 2#   Abduction w/ scapular facilitation  5x D/C  10x, 10x starting in scaption and moving to abd 10x, 10x 10x  10x 10x  10x                                             Ther Ex             Thoracic extension w/ towel roll 20x3" HEP 20x3"  20x3"  NV        Rows  3x15 GTB HEP 3x15 BlkTB Black  3x15          Low trap ER (no moneys)  3x10 YTB HEP YTB  3x10 YTB 3x10 dec range 3x20 YTB  YTB  3x20 YTB  3x20 YTB 3x20  RTB 3x15   Straight arm extension             pec stretch doorway         Low 3x20", high 3x20" HEP Low 3x20", high 3x20"   AAROM abduction w/ eccentric lowering  2x10 HEP 2x10          Horizontal B/L abduction     3x10 GTB  3x15 GTB  3x15  GTB 3x15  GTB 3x15 GTB 3x15 GTB 3x10 BTB   sidelying posterior capsule stretch    4x15" HEP         Seated rows (at UE bike)    2x10 25#          Straight arm extension focus on retraction    RTB 3x10 HEP  RTB 3x10 RTB  3x10 RTB  3x10  RTB 3x12     resisted ER        GTB 15x, RTB 2x15  NV  RTB 3x15   Functional IR stretch w/ strap          4x20" HEP   Ther Activity                                       Gait Training                                       Modalities                                       Assessment IE, POC, Prognosis            Education HEP, POC, Prognosis UHEP

## 2022-12-20 ENCOUNTER — EVALUATION (OUTPATIENT)
Dept: PHYSICAL THERAPY | Facility: CLINIC | Age: 57
End: 2022-12-20

## 2022-12-20 DIAGNOSIS — M25.512 CHRONIC LEFT SHOULDER PAIN: ICD-10-CM

## 2022-12-20 DIAGNOSIS — M75.82 ROTATOR CUFF TENDINITIS, LEFT: ICD-10-CM

## 2022-12-20 DIAGNOSIS — G89.29 CHRONIC LEFT SHOULDER PAIN: ICD-10-CM

## 2022-12-20 DIAGNOSIS — M75.42 SUBACROMIAL IMPINGEMENT OF LEFT SHOULDER: Primary | ICD-10-CM

## 2022-12-20 NOTE — PROGRESS NOTES
PT Re-evaluation     Today's date: 2022  Patient name: Ioana Tarango  : 1965  MRN: [id number]  Referring provider: Philipp Lewis MD  Dx:   Encounter Diagnosis     ICD-10-CM    1  Subacromial impingement of left shoulder  M75 42       2  Rotator cuff tendinitis, left  M75 82       3  Chronic left shoulder pain  M25 512     G89 29           Start Time: 1705  Stop Time: 1743  Total time in clinic (min): 38 minutes    Subjective: Pt reports he is feeling pretty good with subtle improvement and he was able to row 6000 meters without pain during and only slight soreness the next morning  The patient reports improvement in symptoms since previous session  The patient reports 1/10 pain at it's worst over the past 24 hours, and reports 93% improvement in overall condition since beginning formal PT care  Objective: See treatment diary below    Postural Observations  Seated posture: fair  Standing posture: good/fair         Tenderness     Left Shoulder   No tenderness     Active Range of Motion   Left Shoulder   Flexion: WFL  Abduction: 150  External rotation BTH: T4   Internal rotation BTB: T9     Right Shoulder   External rotation BTH: T5   Internal rotation BTB: T8     Passive Range of Motion   Left Shoulder   Flexion: Left shoulder passive forward flexion: pain at end range  WFL  Abduction: WFL discomfort at end range  External rotation 90°: [facility]  Internal rotation 90°: 53 degrees    Scapular Mobility     Additional Scapular Mobility Details  Minor lack of upward rotation w/ abduction and slight dumping on lowering    Joint Play   Left Shoulder  Joints within functional limits are the anterior capsule and inferior capsule  Hypomobile in the posterior capsule      Additional Joint Play Details  Mid-upper thoracic spine slightly hypomobile    Strength/Myotome Testing   Cervical Spine     Left   Interossei strength (t1): 5    Right   Interossei strength (t1): 5    Left Shoulder     Planes of Motion Flexion: 5   Extension: 5  Abduction: 5  External rotation at 0°: 5   Internal rotation at 0°: 5     Isolated Muscles   Lower trapezius: 4+   Middle trapezius: 4+     Right Shoulder     Planes of Motion   Flexion: 5   Extension: 5   Abduction: 5   External rotation at 0°: 5   Internal rotation at 0°: 5     Left Elbow   Flexion: 5  Extension: 5    Right Elbow   Flexion: 5  Extension: 5    Left Wrist/Hand   Wrist extension: 5  Wrist flexion: 5  Radial deviation: 5  Ulnar deviation: 5  Thumb extension: 5    Right Wrist/Hand   Wrist extension: 5  Wrist flexion: 5  Radial deviation: 5  Ulnar deviation: 5  Thumb extension: 5    Tests   Cervical   Negative vertical compression  Left   Negative Spurling's Test A and Spurling's Test B  Right   Negative Spurling's Test A  Left Shoulder   Positive Hawkin's, Neer's, painful arc, ULTT3   Negative anterior slide, active compression ([place]), empty can, lift-off, apprehension, belly press, drop arm, full can, Speed's, ULTT1, ULTT4, anterior slide  and bicep load   Assessment:  Sugar Lozano is a pleasant 62 y o  male who has been receiving PT intervention for L shoulder pain  Pt demonstrates good improvement in strength and ROM with only pain being with cross body movement  Pt still had minor pain with scapular muscle testing, but his biggest limitation is due to repetitive faulty shoulder positioning with movement and exercise  Pt does demonstrate improved scapular control however has to maximally focus on it  Much of pt's session was spent explaining scpaulohumeral mechanics and anatomy, appropriate exercise progression and prescription, and form with exercises and rowing  Pt feels confident to continue to progress on his own with his HEP      Goals  Short Term Goals   Pt will improve UE ROM by 5-10 degrees in all deficient planes order to improve function with reaching and ADLs - MET  Pt will improve UE strength by 1/2 grade in all deficient muscle groups in order to improve function - MET  Pt will decrease pain to 3/10 at its worst - MET  Pt will be independent with a basic HEP - MET    Long Term Goals  Pt will achieve a FOTO score of 71 - MET  Pt will improve UE ROM to [facility] in order to maximize function - MET  Pt will improve UE strength to 5/5 in all deficient muscle groups in order to maximize function with ADLs - Not met  Pt will be able to manage symptoms independently - MET  Pt will be independent with an extensive HEP - MET      Plan: Pt is to be formerly discharged from PT are at this time with his HEP     Precautions: L shoulder impingement     10/4 10/11 10/18 10/27 11/3 11/8 11/15 11/29 12/6 12/13 12/20   Manuals              Supine thoracic Gr  V HVLA mobilization EM EM            Posterior capsule mobs  2x40 Gr  III  EM Gr  III 3x30 EM Gr  III 2x50 PROM 10' PROM 10' 2x30 EM Gr   III 3x30 EM Gr  III, cross body 10x     IR stretch  2' 4'     4' 3'     Scapular mobs        NV* 3' sidelying      Neuro Re-Ed              Prone retraction w/ shoulder extension 20x5" HEP 20x5" 20x5"      10x5"     Prone I Weight NV 3x10 2# 2# 3x10 2# 3x10 2# 3x12 2#  3x12 2#  3x12 2# 3x12 Pball 2# 3x12     Prone T 5x PT facilitation  2x10 PT facilitation 2x10 PT  faciltation 2x10 PT facil Pball B/L 2x12 Pball  B/L  3x12 Pball  B/L  3x12 Pball B/L 3x12 Pball B/L 3x12 1# On table 15x PT facilitation, Pball 10x 2#    Abduction w/ scapular facilitation  5x D/C  10x, 10x starting in scaption and moving to abd 10x, 10x 10x  10x 10x  10x       Education            10'                               Ther Ex              Thoracic extension w/ towel roll 20x3" HEP 20x3"  20x3"  NV         Rows  3x15 GTB HEP 3x15 BlkTB Black  3x15           Low trap ER (no moneys)  3x10 YTB HEP YTB  3x10 YTB 3x10 dec range 3x20 YTB  YTB  3x20 YTB  3x20 YTB 3x20  RTB 3x15    Straight arm extension              pec stretch doorway         Low 3x20", high 3x20" HEP Low 3x20", high 3x20"    AAROM abduction w/ eccentric lowering  2x10 HEP 2x10           Horizontal B/L abduction     3x10 GTB  3x15 GTB  3x15  GTB 3x15  GTB 3x15 GTB 3x15 GTB 3x10 BTB    sidelying posterior capsule stretch    4x15" HEP          Seated rows (at UE bike)    2x10 25#           Straight arm extension focus on retraction    RTB 3x10 HEP  RTB 3x10 RTB  3x10 RTB  3x10  RTB 3x12      resisted ER        GTB 15x, RTB 2x15  NV  RTB 3x15    Functional IR stretch w/ strap          4x20" HEP    Ther Activity                                          Gait Training                                          Modalities                                          Assessment IE, POC, Prognosis          Re-evaluation   Education HEP, POC, Prognosis UHEP

## 2023-01-06 ENCOUNTER — ANESTHESIA (OUTPATIENT)
Dept: ANESTHESIOLOGY | Facility: HOSPITAL | Age: 58
End: 2023-01-06

## 2023-01-06 ENCOUNTER — ANESTHESIA EVENT (OUTPATIENT)
Dept: ANESTHESIOLOGY | Facility: HOSPITAL | Age: 58
End: 2023-01-06

## 2023-01-13 ENCOUNTER — ANESTHESIA (OUTPATIENT)
Dept: GASTROENTEROLOGY | Facility: AMBULARY SURGERY CENTER | Age: 58
End: 2023-01-13

## 2023-01-13 ENCOUNTER — ANESTHESIA EVENT (OUTPATIENT)
Dept: GASTROENTEROLOGY | Facility: AMBULARY SURGERY CENTER | Age: 58
End: 2023-01-13

## 2023-01-13 ENCOUNTER — HOSPITAL ENCOUNTER (OUTPATIENT)
Dept: GASTROENTEROLOGY | Facility: AMBULARY SURGERY CENTER | Age: 58
Setting detail: OUTPATIENT SURGERY
End: 2023-01-13
Attending: COLON & RECTAL SURGERY

## 2023-01-13 VITALS
BODY MASS INDEX: 26.22 KG/M2 | TEMPERATURE: 97.4 F | OXYGEN SATURATION: 94 % | SYSTOLIC BLOOD PRESSURE: 105 MMHG | WEIGHT: 177 LBS | HEART RATE: 62 BPM | HEIGHT: 69 IN | RESPIRATION RATE: 10 BRPM | DIASTOLIC BLOOD PRESSURE: 66 MMHG

## 2023-01-13 DIAGNOSIS — Z12.11 COLON CANCER SCREENING: ICD-10-CM

## 2023-01-13 RX ORDER — PROPOFOL 10 MG/ML
INJECTION, EMULSION INTRAVENOUS AS NEEDED
Status: DISCONTINUED | OUTPATIENT
Start: 2023-01-13 | End: 2023-01-13

## 2023-01-13 RX ORDER — LIDOCAINE HYDROCHLORIDE 10 MG/ML
INJECTION, SOLUTION EPIDURAL; INFILTRATION; INTRACAUDAL; PERINEURAL AS NEEDED
Status: DISCONTINUED | OUTPATIENT
Start: 2023-01-13 | End: 2023-01-13

## 2023-01-13 RX ORDER — SODIUM CHLORIDE, SODIUM LACTATE, POTASSIUM CHLORIDE, CALCIUM CHLORIDE 600; 310; 30; 20 MG/100ML; MG/100ML; MG/100ML; MG/100ML
INJECTION, SOLUTION INTRAVENOUS CONTINUOUS PRN
Status: DISCONTINUED | OUTPATIENT
Start: 2023-01-13 | End: 2023-01-13

## 2023-01-13 RX ADMIN — PROPOFOL 30 MG: 10 INJECTION, EMULSION INTRAVENOUS at 09:59

## 2023-01-13 RX ADMIN — PROPOFOL 30 MG: 10 INJECTION, EMULSION INTRAVENOUS at 10:06

## 2023-01-13 RX ADMIN — SODIUM CHLORIDE, SODIUM LACTATE, POTASSIUM CHLORIDE, AND CALCIUM CHLORIDE: .6; .31; .03; .02 INJECTION, SOLUTION INTRAVENOUS at 09:48

## 2023-01-13 RX ADMIN — PROPOFOL 50 MG: 10 INJECTION, EMULSION INTRAVENOUS at 10:04

## 2023-01-13 RX ADMIN — LIDOCAINE HYDROCHLORIDE 50 MG: 10 INJECTION, SOLUTION EPIDURAL; INFILTRATION; INTRACAUDAL at 09:58

## 2023-01-13 RX ADMIN — PROPOFOL 50 MG: 10 INJECTION, EMULSION INTRAVENOUS at 10:00

## 2023-01-13 RX ADMIN — PROPOFOL 50 MG: 10 INJECTION, EMULSION INTRAVENOUS at 10:02

## 2023-01-13 RX ADMIN — PROPOFOL 150 MG: 10 INJECTION, EMULSION INTRAVENOUS at 09:57

## 2023-01-13 NOTE — H&P
History and Physical   Colon and Rectal Surgery   Shantel Guzman 62 y o  male MRN: [id number]  Unit/Bed#:  Encounter: [id number]  23   @[facility]    No chief complaint on file  History of Present Illness   HPI:  Shantel Guzman is a 62 y o  male who presents for screening colonoscopy  No prior exams  Historical Information   Past Medical History:   Diagnosis Date   • Rotator cuff injury     left     Past Surgical History:   Procedure Laterality Date   • TONSILLECTOMY      childhood   • WISDOM TOOTH EXTRACTION      2017       Meds/Allergies     (Not in a hospital admission)        Current Outpatient Medications:   •  multivitamin (THERAGRAN) TABS, Take 1 tablet by mouth daily, Disp: , Rfl:     No Known Allergies      Social History   Social History     Substance and Sexual Activity   Alcohol Use Yes   • Alcohol/week: 1 0 standard drink   • Types: 1 Cans of beer per week    Comment: 1 per month     Social History     Substance and Sexual Activity   Drug Use Not Currently   • Types: Marijuana    Comment: Experimental  use during      Social History     Tobacco Use   Smoking Status Never   Smokeless Tobacco Never         Family History:   Family History   Problem Relation Age of Onset   • Cancer Father         Lung Cancer ( 10/2011)         Objective     Current Vitals:   Blood Pressure: 122/68 (23)  Pulse: 85 (23)  Temperature: (!) 97 4 °F (36 3 °C) (23)  Temp Source: Temporal (23)  Respirations: 16 (23)  Height: 5' 9" (175 3 cm) (23)  Weight - Scale: 80 3 kg (177 lb) (23)  SpO2: 99 % (23)  No intake or output data in the 24 hours ending 23 [id number]    Physical Exam:  General:  Resting comfortably in bed   Eyes:Sclera anicteric  ENT: Trachea midline  Pulm:  Symmetric chest raise  No respiratory Distress  CV:  Regular on monitor  Abdomen:  Soft NT ND  Extremities:  No clubbing/ cyanosis/ edema    Lab Results:  I have personally reviewed pertinent lab results  Imaging: I have personally reviewed pertinent reports  ASSESSMENT:  Elijah Urias is a 62 y o  male who presents for outpatient colonoscopy  PLAN:  For colonoscopy    Risks/ Benefits reviewed to include but not limited to anesthesia, bleeding, missed lesions, and colonoscopic perforation requiring surgery

## 2023-01-13 NOTE — ANESTHESIA PREPROCEDURE EVALUATION
Procedure:  COLONOSCOPY    Relevant Problems   NEURO/PSYCH   (+) Chronic left shoulder pain        Physical Exam    Airway    Mallampati score: I  TM Distance: >3 FB  Neck ROM: full     Dental   No notable dental hx     Cardiovascular      Pulmonary      Other Findings        Anesthesia Plan  ASA Score- 2     Anesthesia Type- IV sedation with anesthesia with ASA Monitors  Additional Monitors:   Airway Plan:     Comment: I have seen the patient and reviewed the history  Patient to receive IV sedation with full ASA monitors  Risks discussed with the patient, consent signed          Plan Factors-Exercise tolerance (METS): >4 METS  Chart reviewed  Patient summary reviewed  Induction- intravenous  Postoperative Plan-     Informed Consent- Anesthetic plan and risks discussed with patient  I personally reviewed this patient with the CRNA  Discussed and agreed on the Anesthesia Plan with the CRNA  Cassie Patrick

## 2023-01-13 NOTE — ANESTHESIA POSTPROCEDURE EVALUATION
Post-Op Assessment Note    CV Status:  Stable  Pain Score: 0    Pain management: adequate     Mental Status:  Awake and alert   Hydration Status:  Stable   PONV Controlled:  None   Airway Patency:  Patent and adequate      Post Op Vitals Reviewed: Yes      Staff: CRNA, Anesthesiologist         No notable events documented      BP   89/55   Temp     Pulse  82   Resp   16   SpO2   100%

## 2023-01-24 NOTE — PROGRESS NOTES
Daily Note     Today's date: 2022  Patient name: Feliberto Kelley  : 1965  MRN: [id number]  Referring provider: Hali Mata MD  Dx:   Encounter Diagnosis     ICD-10-CM    1  Subacromial impingement of left shoulder  M75 42       2  Rotator cuff tendinitis, left  M75 82       3  Chronic left shoulder pain  M25 512     G89 29           Start Time: 1655  Stop Time: 1735  Total time in clinic (min): 40 minutes    Subjective: Pt reports he noticed a slight regression in symptoms on Saturday  Pt rowed on Thursday and worked out Friday and   Objective: See treatment diary below      Assessment: Pt demonstrated no change in ROM and continued to have pain with active abduction  Scapular mobilizations seemed to improve mobility and following he seemed to have improved scapular control  Pt had slight discomfort when attempting prone T's with 2# weights however with 1# it was tolerable indicating continued scapular muscle being a major contributor of discomfort  Patient demonstrated fatigue post treatment, exhibited good technique with therapeutic exercises and would benefit from continued PT      Plan: Continue per plan of care  Precautions: L shoulder impingement     10/4 10/11 10/18 10/27 11/3 11/8 11/15 11/29 12/6   Manuals            Supine thoracic Gr  V HVLA mobilization EM EM          Posterior capsule mobs  2x40 Gr  III  EM Gr  III 3x30 EM Gr  III 2x50 PROM 10' PROM 10' 2x30 EM Gr   III 3x30 EM Gr  III, cross body 10x   IR stretch  2' 4'     4' 3'   Scapular mobs        NV* 3' sidelying    Neuro Re-Ed            Prone retraction w/ shoulder extension 20x5" HEP 20x5" 20x5"      10x5"   Prone I Weight NV 3x10 2# 2# 3x10 2# 3x10 2# 3x12 2#  3x12 2#  3x12 2# 3x12 Pball 2# 3x12   Prone T 5x PT facilitation  2x10 PT facilitation 2x10 PT  faciltation 2x10 PT facil Pball B/L 2x12 Pball  B/L  3x12 Pball  B/L  3x12 Pball B/L 3x12 Pball B/L 3x12 1#   Abduction w/ scapular facilitation  5x D/C  10x, PVD  --peripheral artery stent graft 2016  -- Left PD and DP weak, Right +   -- Per wife, known hx of difficulty finding pulse with doppler  -- Continue to monitor closely     10x starting in scaption and moving to abd 10x, 10x 10x  10x 10x  10x                                         Ther Ex            Thoracic extension w/ towel roll 20x3" HEP 20x3"  20x3"  NV       Rows  3x15 GTB HEP 3x15 BlkTB Black  3x15         Low trap ER (no moneys)  3x10 YTB HEP YTB  3x10 YTB 3x10 dec range 3x20 YTB  YTB  3x20 YTB  3x20 YTB 3x20    Straight arm extension            pec stretch doorway         Low 3x20", high 3x20"   AAROM abduction w/ eccentric lowering  2x10 HEP 2x10         Horizontal B/L abduction     3x10 GTB  3x15 GTB  3x15  GTB 3x15  GTB 3x15 GTB 3x15 GTB   sidelying posterior capsule stretch    4x15" HEP        Seated rows (at UE bike)    2x10 25#         Straight arm extension focus on retraction    RTB 3x10 HEP  RTB 3x10 RTB  3x10 RTB  3x10  RTB 3x12    resisted ER        GTB 15x, RTB 2x15  NV   Ther Activity                                    Gait Training                                    Modalities                                    Assessment IE, POC, Prognosis           Education HEP, POC, Prognosis UHEP

## 2024-07-24 ENCOUNTER — OFFICE VISIT (OUTPATIENT)
Dept: DERMATOLOGY | Facility: CLINIC | Age: 59
End: 2024-07-24
Payer: COMMERCIAL

## 2024-07-24 VITALS — WEIGHT: 179 LBS | HEIGHT: 69 IN | BODY MASS INDEX: 26.51 KG/M2 | TEMPERATURE: 97.9 F

## 2024-07-24 DIAGNOSIS — D48.5 NEOPLASM OF UNCERTAIN BEHAVIOR OF SKIN: Primary | ICD-10-CM

## 2024-07-24 PROCEDURE — 11103 TANGNTL BX SKIN EA SEP/ADDL: CPT | Performed by: STUDENT IN AN ORGANIZED HEALTH CARE EDUCATION/TRAINING PROGRAM

## 2024-07-24 PROCEDURE — 99204 OFFICE O/P NEW MOD 45 MIN: CPT | Performed by: STUDENT IN AN ORGANIZED HEALTH CARE EDUCATION/TRAINING PROGRAM

## 2024-07-24 PROCEDURE — 88305 TISSUE EXAM BY PATHOLOGIST: CPT | Performed by: STUDENT IN AN ORGANIZED HEALTH CARE EDUCATION/TRAINING PROGRAM

## 2024-07-24 PROCEDURE — 11102 TANGNTL BX SKIN SINGLE LES: CPT | Performed by: STUDENT IN AN ORGANIZED HEALTH CARE EDUCATION/TRAINING PROGRAM

## 2024-07-24 PROCEDURE — 88341 IMHCHEM/IMCYTCHM EA ADD ANTB: CPT | Performed by: STUDENT IN AN ORGANIZED HEALTH CARE EDUCATION/TRAINING PROGRAM

## 2024-07-24 PROCEDURE — 88342 IMHCHEM/IMCYTCHM 1ST ANTB: CPT | Performed by: STUDENT IN AN ORGANIZED HEALTH CARE EDUCATION/TRAINING PROGRAM

## 2024-07-24 NOTE — PATIENT INSTRUCTIONS
"INFORMED CONSENT DISCUSSION AND POST-OPERATIVE INSTRUCTIONS FOR PATIENT    I.  RATIONALE FOR PROCEDURE  I understand that a skin biopsy allows the Dermatologist to test a lesion or rash under the microscope to obtain a diagnosis.  It usually involves numbing the area with numbing medication and removing a small piece of skin; sometimes the area will be closed with sutures. In this specific procedure, sutures are not usually needed.  If any sutures are placed, then they are usually need to be removed in 2 weeks or less.    I understand that my Dermatologist recommends that a skin \"shave\" biopsy be performed today.  A local anesthetic, similar to the kind that a dentist uses when filling a cavity, will be injected with a very small needle into the skin area to be sampled.  The injected skin and tissue underneath \"will go to sleep” and become numb so no pain should be felt afterwards.  An instrument shaped like a tiny \"razor blade\" (shave biopsy instrument) will be used to cut a small piece of tissue and skin from the area so that a sample of tissue can be taken and examined more closely under the microscope.  A slight amount of bleeding will occur, but it will be stopped with direct pressure and a pressure bandage and any other appropriate methods.  I understands that a scar will form where the wound was created.  Surgical ointment will be applied to help protect the wound.  Sutures are not usually needed.    II.  RISKS AND POTENTIAL COMPLICATIONS   I understand the risks and potential complications of a skin biopsy include but are not limited to the following:  Bleeding  Infection  Pain  Scar/keloid  Skin discoloration  Incomplete Removal  Recurrence  Nerve Damage/Numbness/Loss of Function  Allergic Reaction to Anesthesia  Biopsies are diagnostic procedures and based on findings additional treatment or evaluation may be required  Loss or destruction of specimen resulting in no additional findings    My Dermatologist " "has explained to me the nature of the condition, the nature of the procedure, and the benefits to be reasonably expected compared with alternative approaches.  My Dermatologist has discussed the likelihood of major risks or complications of this procedure including the specific risks listed above, such as bleeding, infection, and scarring/keloid.  I understand that a scar is expected after this procedure.  I understand that my physician cannot predict if the scar will form a \"keloid,\" which extends beyond the borders of the wound that is created.  A keloid is a thick, painful, and bumpy scar.  A keloid can be difficult to treat, as it does not always respond well to therapy, which includes injecting cortisone directly into the keloid every few weeks.  While this usually reduces the pain and size of the scar, it does not eliminate it.      I understand that photographs may be taken before and after the procedure.  These will be maintained as part of the medical providers confidential records and may not be made available to me.  I further authorize the medical provider to use the photographs for teaching purposes or to illustrate scientific papers, books, or lectures if in his/her judgment, medical research, education, or science may benefit from its use.    I have had an opportunity to fully inquire about the risks and benefits of this procedure and its alternatives.   I have been given ample time and opportunity to ask questions and to seek a second opinion if I wished to do so.  I acknowledge that there have specifically been no guarantees as to the cosmetic results from the procedure.  I am aware that with any procedure there is always the possibility of an unexpected complication.    III. POST-PROCEDURAL CARE (WHAT YOU WILL NEED TO DO \"AFTER THE BIOPSY\" TO OPTIMIZE HEALING)    Keep the area clean and dry.  Try NOT to remove the bandage or get it wet for the first 24 hours.    Gently clean the area and apply " "surgical ointment (such as Vaseline petrolatum ointment, which is available \"over the counter\" and not a prescription) to the biopsy site for up to 2 weeks straight.  This acts to protect the wound from the outside world.      Sutures are not usually placed in this procedure.  If any sutures were placed, return for suture removal as instructed (generally 1 week for the face, 2 weeks for the body).      Take Acetaminophen (Tylenol) for discomfort, if no contraindications.  Ibuprofen or aspirin could make bleeding worse.    Call our office immediately for signs of infection: fever, chills, increased redness, warmth, tenderness, discomfort/pain, or pus or foul smell coming from the wound.    WHAT TO DO IF THERE IS ANY BLEEDING?  If a small amount of bleeding is noticed, place a clean cloth over the area and apply firm pressure for ten minutes.  Check the wound after 10 minutes of direct pressure.  If bleeding persists, try one more time for an additional 10 minutes of direct pressure on the area.  If the bleeding becomes heavier or does not stop after the second attempt, or if you have any other questions about this procedure, then please call your [facility]'s Dermatologist by calling [phone] (SKIN).     I hereby acknowledge that I have reviewed and verified the site with my Dermatologist and have requested and authorized my Dermatologist to proceed with the procedure.         "

## 2024-07-24 NOTE — PROGRESS NOTES
"[facility] Dermatology Clinic Note     Patient Name: Jomar Baugh  Encounter Date: 7/24/2024     Have you been cared for by a [facility] Dermatologist in the last 3 years and, if so, which description applies to you?    NO.   I am considered a \"new\" patient and must complete all patient intake questions. I am MALE/not capable of bearing children.    REVIEW OF SYSTEMS:  Have you recently had or currently have any of the following? Recent fever or chills? No  Any non-healing wound? No   PAST MEDICAL HISTORY:  Have you personally ever had or currently have any of the following?  If \"YES,\" then please provide more detail. Skin cancer (such as Melanoma, Basal Cell Carcinoma, Squamous Cell Carcinoma?  No  Tuberculosis, HIV/AIDS, Hepatitis B or C: No  Radiation Treatment No   HISTORY OF IMMUNOSUPPRESSION:   Do you have a history of any of the following:  Systemic Immunosuppression such as Diabetes, Biologic or Immunotherapy, Chemotherapy, Organ Transplantation, Bone Marrow Transplantation?  No     Answering \"YES\" requires the addition of the dotphrase \"IMMUNOSUPPRESSED\" as the first diagnosis of the patient's visit.   FAMILY HISTORY:  Any \"first degree relatives\" (parent, brother, sister, or child) with the following?    Skin Cancer, Pancreatic or Other Cancer? YES, Brothers have hx of skin cancer.   PATIENT EXPERIENCE:    Do you want the Dermatologist to perform a COMPLETE skin exam today including a clinical examination under the \"bra and underwear\" areas?  NO  If necessary, do we have your permission to call and leave a detailed message on your Preferred Phone number that includes your specific medical information?  Yes      No Known Allergies   Current Outpatient Medications:     multivitamin (THERAGRAN) TABS, Take 1 tablet by mouth daily, Disp: , Rfl:           Whom besides the patient is providing clinical information about today's encounter?   NO ADDITIONAL HISTORIAN (patient alone provided history)    Physical " "Exam and Assessment/Plan by Diagnosis:    Right Lower back; itchy; present for a year.    NEOPLASM OF UNCERTAIN BEHAVIOR OF SKIN    Physical Exam:  (Anatomic Location); (Size and Morphological Description); (Differential Diagnosis):  Specimen A: Left Upper Arm; Skin; Shave Biopsy; 59 year old male with 2 x 2 cm red plaque Differential Diagnosis: Tinea verses porokeratosis versus skin cancer     Specimen B: Right Lower Back; Skin; Shave Biopsy; 59 year old male with 1.1 cm red plaque; Differential Diagnosis: Skin cancer.     Pertinent Positives:  Pertinent Negatives:    Additional History of Present Condition:  Noticed lesion on the left upper arm about a year ago. Reports it is not painful or itchy. It does not bleed. The lesion on right lower back has also been present about 1 year and reports it becomes itching occasionally.     Assessment and Plan:  I have discussed with the patient that a sample of skin via a \"skin biopsy” would be potentially helpful to further make a specific diagnosis under the microscope.  Based on a thorough discussion of this condition and the management approach to it (including a comprehensive discussion of the known risks, side effects and potential benefits of treatment), the patient (family) agrees to implement the following specific plan:    Procedure:  Skin Biopsy.  After a thorough discussion of treatment options and risk/benefits/alternatives (including but not limited to local pain, scarring, dyspigmentation, blistering, possible superinfection, and inability to confirm a diagnosis via histopathology), verbal and written consent were obtained and portion of the rash was biopsied for tissue sample.  See below for consent that was obtained from patient and subsequent Procedure Note.      PROCEDURE TANGENTIAL (SHAVE) BIOPSY NOTE x2:    Performing Physician:  Dr. Roman  Anatomic Location; Clinical Description with size (cm); Pre-Op Diagnosis:   Specimen A: Left Upper Arm; Skin; " "Shave Biopsy; 59 year old male with 2 x 2 cm red plaque Differential Diagnosis: Tinea verses porokeratosis versus skin cancer     Specimen B: Right Lower Back; Skin; Shave Biopsy; 59 year old male with 1.1 cm red plaque; Differential Diagnosis: Skin cancer.   Post-op diagnosis: Same     Local anesthesia: 1% xylocaine with epi      Topical anesthesia: None    Hemostasis: Aluminum chloride       After obtaining informed consent  at which time there was a discussion about the purpose of biopsy  and low risks of infection and bleeding.  The area was prepped and draped in the usual fashion. Anesthesia was obtained with 1% lidocaine with epinephrine. A shave biopsy to an appropriate sampling depth was obtained by Shave (Dermablade or 15 blade) The resulting wound was covered with surgical ointment and bandaged appropriately.     The patient tolerated the procedure well without complications and was without signs of functional compromise.      Specimen has been sent for review by Dermatopathology.    Standard post-procedure care has been explained and has been included in written form within the patient's copy of Informed Consent.    INFORMED CONSENT DISCUSSION AND POST-OPERATIVE INSTRUCTIONS FOR PATIENT    I.  RATIONALE FOR PROCEDURE  I understand that a skin biopsy allows the Dermatologist to test a lesion or rash under the microscope to obtain a diagnosis.  It usually involves numbing the area with numbing medication and removing a small piece of skin; sometimes the area will be closed with sutures. In this specific procedure, sutures are not usually needed.  If any sutures are placed, then they are usually need to be removed in 2 weeks or less.    I understand that my Dermatologist recommends that a skin \"shave\" biopsy be performed today.  A local anesthetic, similar to the kind that a dentist uses when filling a cavity, will be injected with a very small needle into the skin area to be sampled.  The injected skin and " "tissue underneath \"will go to sleep” and become numb so no pain should be felt afterwards.  An instrument shaped like a tiny \"razor blade\" (shave biopsy instrument) will be used to cut a small piece of tissue and skin from the area so that a sample of tissue can be taken and examined more closely under the microscope.  A slight amount of bleeding will occur, but it will be stopped with direct pressure and a pressure bandage and any other appropriate methods.  I understands that a scar will form where the wound was created.  Surgical ointment will be applied to help protect the wound.  Sutures are not usually needed.    II.  RISKS AND POTENTIAL COMPLICATIONS   I understand the risks and potential complications of a skin biopsy include but are not limited to the following:  Bleeding  Infection  Pain  Scar/keloid  Skin discoloration  Incomplete Removal  Recurrence  Nerve Damage/Numbness/Loss of Function  Allergic Reaction to Anesthesia  Biopsies are diagnostic procedures and based on findings additional treatment or evaluation may be required  Loss or destruction of specimen resulting in no additional findings    My Dermatologist has explained to me the nature of the condition, the nature of the procedure, and the benefits to be reasonably expected compared with alternative approaches.  My Dermatologist has discussed the likelihood of major risks or complications of this procedure including the specific risks listed above, such as bleeding, infection, and scarring/keloid.  I understand that a scar is expected after this procedure.  I understand that my physician cannot predict if the scar will form a \"keloid,\" which extends beyond the borders of the wound that is created.  A keloid is a thick, painful, and bumpy scar.  A keloid can be difficult to treat, as it does not always respond well to therapy, which includes injecting cortisone directly into the keloid every few weeks.  While this usually reduces the pain and " "size of the scar, it does not eliminate it.      I understand that photographs may be taken before and after the procedure.  These will be maintained as part of the medical providers confidential records and may not be made available to me.  I further authorize the medical provider to use the photographs for teaching purposes or to illustrate scientific papers, books, or lectures if in his/her judgment, medical research, education, or science may benefit from its use.    I have had an opportunity to fully inquire about the risks and benefits of this procedure and its alternatives.   I have been given ample time and opportunity to ask questions and to seek a second opinion if I wished to do so.  I acknowledge that there have specifically been no guarantees as to the cosmetic results from the procedure.  I am aware that with any procedure there is always the possibility of an unexpected complication.    III. POST-PROCEDURAL CARE (WHAT YOU WILL NEED TO DO \"AFTER THE BIOPSY\" TO OPTIMIZE HEALING)    Keep the area clean and dry.  Try NOT to remove the bandage or get it wet for the first 24 hours.    Gently clean the area and apply surgical ointment (such as Vaseline petrolatum ointment, which is available \"over the counter\" and not a prescription) to the biopsy site for up to 2 weeks straight.  This acts to protect the wound from the outside world.      Sutures are not usually placed in this procedure.  If any sutures were placed, return for suture removal as instructed (generally 1 week for the face, 2 weeks for the body).      Take Acetaminophen (Tylenol) for discomfort, if no contraindications.  Ibuprofen or aspirin could make bleeding worse.    Call our office immediately for signs of infection: fever, chills, increased redness, warmth, tenderness, discomfort/pain, or pus or foul smell coming from the wound.    WHAT TO DO IF THERE IS ANY BLEEDING?  If a small amount of bleeding is noticed, place a clean cloth over the " area and apply firm pressure for ten minutes.  Check the wound after 10 minutes of direct pressure.  If bleeding persists, try one more time for an additional 10 minutes of direct pressure on the area.  If the bleeding becomes heavier or does not stop after the second attempt, or if you have any other questions about this procedure, then please call your [facility]'s Dermatologist by calling [phone] (SKIN).     I hereby acknowledge that I have reviewed and verified the site with my Dermatologist and have requested and authorized my Dermatologist to proceed with the procedure.         Scribe Attestation      I,:  Yolanda Gunderson am acting as a scribe while in the presence of the attending physician.:       I,:  Ramón Bañuelos MD personally performed the services described in this documentation    as scribed in my presence.:         Citlaly Roman MD  Dermatology, PGY-2    This encounter (02891 or [id number]) has a MODERATE level of medical decision making (MDM) given the presence of at least 2 of the elements of MDM (below):  *MODERATE number and complexity of problems addressed: 1 or more chronic illnesses with exacerbation, progression, or side effects of treatment; OR 2 or more stable chronic illnesses; OR 1 undiagnosed new problem with uncertain prognosis; OR 1 acute illness with systemic symptoms; OR 1 acute uncomplicated injury  *MODERATE amount and/or complexity of data reviewed: this includes review of previous notes and/or lab tests, independent interpretation of tests performed by another healthcare professional, ordering tests, assessment requiring independent historian, or discussion with other healthcare professionals.  *MODERATE risk of complications and/or morbidity or mortality of patient management (for example, prescription drug management, decisions regarding minor surgery with identified patient or procedure risk factors).

## 2024-11-13 ENCOUNTER — PROCEDURE VISIT (OUTPATIENT)
Dept: DERMATOLOGY | Facility: CLINIC | Age: 59
End: 2024-11-13
Payer: COMMERCIAL

## 2024-11-13 VITALS — BODY MASS INDEX: 26.76 KG/M2 | TEMPERATURE: 98.2 F | WEIGHT: 180.7 LBS | HEIGHT: 69 IN

## 2024-11-13 DIAGNOSIS — C44.619 BASAL CELL CARCINOMA (BCC) OF SKIN OF LEFT UPPER EXTREMITY INCLUDING SHOULDER: Primary | ICD-10-CM

## 2024-11-13 DIAGNOSIS — C44.519 BASAL CELL CARCINOMA (BCC) OF BACK: ICD-10-CM

## 2024-11-13 PROCEDURE — 17262 DSTRJ MAL LES T/A/L 1.1-2.0: CPT | Performed by: STUDENT IN AN ORGANIZED HEALTH CARE EDUCATION/TRAINING PROGRAM

## 2024-11-13 NOTE — PATIENT INSTRUCTIONS
CURETTAGE AND DESICCATION Malignant and Premalignant Lesion        Assessment and Plan:  Based on a thorough discussion of this condition and the management approach to it (including a comprehensive discussion of the known risks, side effects and potential benefits of treatment), the patient (family) agrees to treat the above described lesion with desiccation and curettage.    Procedure:  The area was cleanly  prepped in usual manner  Anesthesia:1% xylocaine with epi    The above described lesion was aggressively curetted to mid dermis followed by desiccation  Number of cycles of desiccation and curettage 3  A clean dry dressing was placed on site. Oral and written postop care instructions were discussed and reviewed      DISCUSSION OF TREATMENT AND POSTOP CARE FOR PATIENT    What is curettage and desiccation?  Curettage and desiccation is a type of electrosurgery in which a skin lesion is scraped off and heat is applied to the skin surface.    What is involved in curettage and cautery?  Your doctor will explain to you why your skin lesion needs treatment and the procedure involved. You may have to sign a consent form to indicate that you consent to the surgical procedure. Tell your doctor if you are taking any medication, or if you have any allergies or medical conditions.  The doctor will inject some local anaesthetic into the area surrounding the lesion to be treated. This will make the skin go numb so no pain should be felt during the procedure. You may feel a pushing sensation but this should not be painful. The skin lesion is scraped off with a curette, which is like a small spoon with very sharp edges.  The lesion should be sent to a pathology laboratory for analysis. The wound surface is then cauterised with a hot wire beaded tip or electrosurgical unit (diathermy). This stops bleeding and helps destroys any remaining skin tumour cells.  This procedure is usually repeated twice for malignant skin lesions  (serial curettage and cautery).  A dressing may be applied and instructions should be given on how to care for your wound.    What types of skin lesions can be treated by curettage?  Curettage is suitable to treat lesions where the material being scraped off is softer than the surrounding skin or when there is a natural cleavage plane between the lesion and the surrounding normal tissue. The following are sometimes treated by curettage:  Squamous cell carcinoma in situ   Actinic keratoses   Basal cell carcinomas that are large, deep or recurrent are usually not suitable for curettage. Lesions with poorly defined edges are also generally unsuitable.Curettage and desiccation is most often used in more superficial type basal cell carcinoma.    Will I have a scar?  Curettage often results in some sort of scar especially if accompanied by cautery.   The scars from curettage are usually flat and round. They are a similar size to that of the original skin lesion. Some people have an abnormal response to skin healing and these people may get larger scars than usual (keloids and hypertrophic scarring).    How do I look after the wound following skin curettage?  The wound may be tender when the local anaesthetic wears off.  Leave the dressing in place till the nest day. Avoid strenuous exertion and stretching of the area.   If there is any bleeding, press on the wound firmly with a folded towel without looking at it for 30 minutes. If it is still bleeding after this time, seek medical attention.  Follow instructions a written in our consent ,  The wound from curettage will take approximately 2-3 weeks to heal over. The scar will initially be red and raised but usually reduces in colour and size over several months.  .

## 2024-11-13 NOTE — PROGRESS NOTES
"[facility] Note     Patient Name: Jomar Baugh  Encounter Date: 11/13/2024     Have you been cared for by a [facility] Dermatologist in the last 3 years and, if so, which description applies to you?    Yes.  I have been here within the last 3 years, and my medical history has NOT changed since that time.  I am MALE/not capable of bearing children.    REVIEW OF SYSTEMS:  Have you recently had or currently have any of the following? No changes in my recent health.   PAST MEDICAL HISTORY:  Have you personally ever had or currently have any of the following?  If \"YES,\" then please provide more detail. No changes in my medical history.   HISTORY OF IMMUNOSUPPRESSION: Do you have a history of any of the following:  Systemic Immunosuppression such as Diabetes, Biologic or Immunotherapy, Chemotherapy, Organ Transplantation, Bone Marrow Transplantation or Prednisone?  No     Answering \"YES\" requires the addition of the dotphrase \"IMMUNOSUPPRESSED\" as the first diagnosis of the patient's visit.   FAMILY HISTORY:  Any \"first degree relatives\" (parent, brother, sister, or child) with the following?    No changes in my family's known health.   PATIENT EXPERIENCE:    Do you want the Dermatologist to perform a COMPLETE skin exam today including a clinical examination under the \"bra and underwear\" areas?  NO  If necessary, do we have your permission to call and leave a detailed message on your Preferred Phone number that includes your specific medical information?  Yes      No Known Allergies   Current Outpatient Medications:     multivitamin (THERAGRAN) TABS, Take 1 tablet by mouth daily, Disp: , Rfl:           Whom besides the patient is providing clinical information about today's encounter?   NO ADDITIONAL HISTORIAN (patient alone provided history)    Physical Exam and Assessment/Plan by Diagnosis:      CURETTAGE AND DESICCATION OF MALIGNANT LESION x 2    Diagnosis: Basal cell carcinoma, superficial type "   Prior biopsy: Yes  Access Number: [id number]  Location   1. Left upper arm  2. right lower back   Size preop       1. Left upper arm: 2 cm        2. Right lower back: 1.8 cm   Size postop   Left upper arm: 2.2cm  Right Lower back: 2cm        Assessment and Plan:  Based on a thorough discussion of this condition and the management approach to it (including a comprehensive discussion of the known risks, side effects and potential benefits of treatment), the patient (family) agrees to treat the above described lesion with desiccation and curettage.    Procedure:  The area was cleanly  prepped in usual manner  Anesthesia:1% xylocaine with epi    The above described lesion was aggressively curetted to mid dermis followed by desiccation  Number of cycles of desiccation and curettage 3  A clean dry dressing was placed on site. Oral and written postop care instructions were discussed and reviewed      DISCUSSION OF TREATMENT AND POSTOP CARE FOR PATIENT    What is curettage and desiccation?  Curettage and desiccation is a type of electrosurgery in which a skin lesion is scraped off and heat is applied to the skin surface.    What is involved in curettage and cautery?  Your doctor will explain to you why your skin lesion needs treatment and the procedure involved. You may have to sign a consent form to indicate that you consent to the surgical procedure. Tell your doctor if you are taking any medication, or if you have any allergies or medical conditions.  The doctor will inject some local anaesthetic into the area surrounding the lesion to be treated. This will make the skin go numb so no pain should be felt during the procedure. You may feel a pushing sensation but this should not be painful. The skin lesion is scraped off with a curette, which is like a small spoon with very sharp edges.  The lesion should be sent to a pathology laboratory for analysis. The wound surface is then cauterised with a hot wire beaded tip or  electrosurgical unit (diathermy). This stops bleeding and helps destroys any remaining skin tumour cells.  This procedure is usually repeated twice for malignant skin lesions (serial curettage and cautery).  A dressing may be applied and instructions should be given on how to care for your wound.    What types of skin lesions can be treated by curettage?  Curettage is suitable to treat lesions where the material being scraped off is softer than the surrounding skin or when there is a natural cleavage plane between the lesion and the surrounding normal tissue. The following are sometimes treated by curettage:  Squamous cell carcinoma in situ   Actinic keratoses   Basal cell carcinomas that are large, deep or recurrent are usually not suitable for curettage. Lesions with poorly defined edges are also generally unsuitable.Curettage and desiccation is most often used in more superficial type basal cell carcinoma.    Will I have a scar?  Curettage often results in some sort of scar especially if accompanied by cautery.   The scars from curettage are usually flat and round. They are a similar size to that of the original skin lesion. Some people have an abnormal response to skin healing and these people may get larger scars than usual (keloids and hypertrophic scarring).    How do I look after the wound following skin curettage?  The wound may be tender when the local anaesthetic wears off.  Leave the dressing in place till the nest day. Avoid strenuous exertion and stretching of the area.   If there is any bleeding, press on the wound firmly with a folded towel without looking at it for 30 minutes. If it is still bleeding after this time, seek medical attention.  Follow instructions a written in our consent ,  The wound from curettage will take approximately 2-3 weeks to heal over. The scar will initially be red and raised but usually reduces in colour and size over several months.  .    Scribe Attestation      I,:   Hilario Varela am acting as a scribe while in the presence of the attending physician.:       I,:  Ramón Bañuelos MD personally performed the services described in this documentation    as scribed in my presence.:            Resident; Dr Jessie DAVIS

## 2025-04-08 ENCOUNTER — OFFICE VISIT (OUTPATIENT)
Dept: DERMATOLOGY | Facility: CLINIC | Age: 60
End: 2025-04-08
Payer: COMMERCIAL

## 2025-04-08 VITALS — TEMPERATURE: 98 F | HEIGHT: 69 IN | WEIGHT: 180 LBS | BODY MASS INDEX: 26.66 KG/M2

## 2025-04-08 DIAGNOSIS — D48.5 NEOPLASM OF UNCERTAIN BEHAVIOR OF SKIN: ICD-10-CM

## 2025-04-08 DIAGNOSIS — D22.71 MULTIPLE BENIGN MELANOCYTIC NEVI OF UPPER AND LOWER EXTREMITIES AND TRUNK: ICD-10-CM

## 2025-04-08 DIAGNOSIS — D22.72 MULTIPLE BENIGN MELANOCYTIC NEVI OF UPPER AND LOWER EXTREMITIES AND TRUNK: ICD-10-CM

## 2025-04-08 DIAGNOSIS — L57.8 OTHER SKIN CHANGES DUE TO CHRONIC EXPOSURE TO NONIONIZING RADIATION: ICD-10-CM

## 2025-04-08 DIAGNOSIS — Z85.828 HISTORY OF BASAL CELL CARCINOMA (BCC): Primary | ICD-10-CM

## 2025-04-08 DIAGNOSIS — D18.01 CHERRY ANGIOMA: ICD-10-CM

## 2025-04-08 DIAGNOSIS — D22.62 MULTIPLE BENIGN MELANOCYTIC NEVI OF UPPER AND LOWER EXTREMITIES AND TRUNK: ICD-10-CM

## 2025-04-08 DIAGNOSIS — D22.61 MULTIPLE BENIGN MELANOCYTIC NEVI OF UPPER AND LOWER EXTREMITIES AND TRUNK: ICD-10-CM

## 2025-04-08 DIAGNOSIS — L81.4 LENTIGO: ICD-10-CM

## 2025-04-08 DIAGNOSIS — D22.5 MULTIPLE BENIGN MELANOCYTIC NEVI OF UPPER AND LOWER EXTREMITIES AND TRUNK: ICD-10-CM

## 2025-04-08 DIAGNOSIS — L57.0 AK (ACTINIC KERATOSIS): ICD-10-CM

## 2025-04-08 PROCEDURE — 88341 IMHCHEM/IMCYTCHM EA ADD ANTB: CPT | Performed by: STUDENT IN AN ORGANIZED HEALTH CARE EDUCATION/TRAINING PROGRAM

## 2025-04-08 PROCEDURE — 11102 TANGNTL BX SKIN SINGLE LES: CPT | Performed by: DERMATOLOGY

## 2025-04-08 PROCEDURE — 88305 TISSUE EXAM BY PATHOLOGIST: CPT | Performed by: STUDENT IN AN ORGANIZED HEALTH CARE EDUCATION/TRAINING PROGRAM

## 2025-04-08 PROCEDURE — 99214 OFFICE O/P EST MOD 30 MIN: CPT | Performed by: DERMATOLOGY

## 2025-04-08 PROCEDURE — 17003 DESTRUCT PREMALG LES 2-14: CPT | Performed by: DERMATOLOGY

## 2025-04-08 PROCEDURE — 17000 DESTRUCT PREMALG LESION: CPT | Performed by: DERMATOLOGY

## 2025-04-08 PROCEDURE — 88342 IMHCHEM/IMCYTCHM 1ST ANTB: CPT | Performed by: STUDENT IN AN ORGANIZED HEALTH CARE EDUCATION/TRAINING PROGRAM

## 2025-04-08 PROCEDURE — 11103 TANGNTL BX SKIN EA SEP/ADDL: CPT | Performed by: DERMATOLOGY

## 2025-04-08 NOTE — PROGRESS NOTES
"[facility] Dermatology Clinic Note     Patient Name: Jomar Baugh  Encounter Date: 4/8/2025     Have you been cared for by a [facility] Dermatologist in the last 3 years and, if so, which description applies to you?    Yes.  I have been here within the last 3 years, and my medical history has NOT changed since that time.  I am MALE/not capable of bearing children.    REVIEW OF SYSTEMS:  Have you recently had or currently have any of the following? No changes in my recent health.   PAST MEDICAL HISTORY:  Have you personally ever had or currently have any of the following?  If \"YES,\" then please provide more detail. No changes in my medical history.   HISTORY OF IMMUNOSUPPRESSION: Do you have a history of any of the following:  Systemic Immunosuppression such as Diabetes, Biologic or Immunotherapy, Chemotherapy, Organ Transplantation, Bone Marrow Transplantation or Prednisone?  No     Answering \"YES\" requires the addition of the dotphrase \"IMMUNOSUPPRESSED\" as the first diagnosis of the patient's visit.   FAMILY HISTORY:  Any \"first degree relatives\" (parent, brother, sister, or child) with the following?    No changes in my family's known health.   PATIENT EXPERIENCE:    Do you want the Dermatologist to perform a COMPLETE skin exam today including a clinical examination under the \"bra and underwear\" areas?  Yes  If necessary, do we have your permission to call and leave a detailed message on your Preferred Phone number that includes your specific medical information?  Yes      No Known Allergies   Current Outpatient Medications:     multivitamin (THERAGRAN) TABS, Take 1 tablet by mouth daily, Disp: , Rfl:           Whom besides the patient is providing clinical information about today's encounter?   NO ADDITIONAL HISTORIAN (patient alone provided history)    Physical Exam and Assessment/Plan by Diagnosis:    HISTORY OF BASAL CELL CARCINOMA    Physical Exam:  Anatomic Location Affected:  Left upper arm and right " lower back   Morphological Description of scar:  well-healed   Suspected Recurrence: No  Pertinent Positives:  Pertinent Negatives:      Additional History of Present Condition:  History of basal cell carcinoma with no sign of recurrence. Patient had ED&C done on 11/13/2024 Access Number: [id number]    Assessment and Plan:  Based on a thorough discussion of this condition and the management approach to it (including a comprehensive discussion of the known risks, side effects and potential benefits of treatment), the patient (family) agrees to implement the following specific plan:  Skin exams every 6 months     How can basal cell carcinoma be prevented?  The most important way to prevent BCC is to avoid sunburn. This is especially important in childhood and early life. Fair skinned individuals and those with a personal or family history of BCC should protect their skin from sun exposure daily, year-round and lifelong.  Stay indoors or under the shade in the middle of the day   Wear covering clothing   Apply high protection factor SPF50+ broad-spectrum sunscreens generously to exposed skin if outdoors   Avoid indoor tanning (sun beds, solaria)  Oral nicotinamide (vitamin B3) in a dose of 500 mg twice daily may reduce the number and severity of BCCs.    What is the outlook for basal cell carcinoma?  Most BCCs are cured by treatment. Cure is most likely if treatment is undertaken when the lesion is small.  About 50% of people with BCC develop a second one within 3 years of the first. They are also at increased risk of other skin cancers, especially melanoma. Regular self-skin examinations and long-term annual skin checks by an experienced health professional are recommended.     HAWK ANGIOMAS     Physical Exam:  Anatomic Location Affected:  Trunk and extremities  Morphological Description:  Scattered cherry red papules  Denies pain, itch, bleeding. No treatments tried. Present for years. Present constantly; no  "modifying factors which make it worse or better.     Assessment and Plan:  Based on a thorough discussion of this condition and the management approach to it (including a comprehensive discussion of the known risks, side effects and potential benefits of treatment), the patient (family) agrees to implement the following specific plan:  Reassure benign        SEBORRHEIC KERATOSIS; NON-INFLAMED     Physical Exam:  Anatomic Location Affected:  Trunk and extremities  Morphological Description:  Waxy, smooth to warty textured, yellow to brownish-grey to dark brown to blackish, discrete, \"stuck-on\" appearing papules.  Present for years. Denies pain, itch, bleeding.      Additional History of Present Condition:  Present constantly; no modifying factors which make it worse or better. No prior treatment.       Assessment and Plan:  Based on a thorough discussion of this condition and the management approach to it (including a comprehensive discussion of the known risks, side effects and potential benefits of treatment), the patient (family) agrees to implement the following specific plan:  Reassure benign  Use sun protection.  Apply SPF 30 or higher at least three times a day.  Wear sun protecting clothing and hats.        SOLAR LENTIGINES   OTHER SKIN CHANGES DUE TO CHRONIC EXPOSURE TO NONIONIZING RADIATION     Physical Exam:  Anatomic Location Affected:  Sun exposed areas of back, chest, arms, legs  Morphological Description:  Multiple scattered brown to tan evenly pigmented macules   Denies pain, itch, bleeding. No treatments tried. Present for months - years. Reports getting newer lesions with sun exposure.         Assessment and Plan:  Based on a thorough discussion of this condition and the management approach to it (including a comprehensive discussion of the known risks, side effects and potential benefits of treatment), the patient (family) agrees to implement the following specific plan:  Reassure benign  Use sun " "protection.  Apply SPF 30 or higher at least three times a day.  Wear sun protecting clothing and hats.         MULTIPLE MELANOCYTIC NEVI (\"Moles\")     Physical Exam:  Anatomic Location Affected: Trunk and extremities  Morphological Description:  Scattered, round to ovoid, symmetrical-appearing, even bordered, skin colored to dark brown macules/papules  Denies pain, itch, bleeding. No treatments tried. Present for years. Present constantly; no modifying factors which make it worse or better. Denies actively changing or growing moles.      Assessment and Plan:  Based on a thorough discussion of this condition and the management approach to it (including a comprehensive discussion of the known risks, side effects and potential benefits of treatment), the patient (family) agrees to implement the following specific plan:  Reassure benign  Monitor for changes  Use sun protection.  Apply SPF 30 or higher at least three times a day.  Wear sun protecting clothing and hats.       Worrisome signs of skin malignancy discussed, questions answered. Regular self-skin check discussed. Advised to call or return to office if patient notices any spots of concern, rapidly growing/changing lesions, bleeding lesions, non-healing lesions. Advised regular SPF use.     ACTINIC KERATOSES  Physical Exam:  Anatomic Location Affected:  Mid upper back, left cheek  (2)  Morphological Description:  Thin pink papule(s) with gritty scale       Assessment and Plan:  Based on a thorough discussion of this condition and the management approach to it (including a comprehensive discussion of the known risks, side effects and potential benefits of treatment), the patient (family) agrees to implement the following specific plan:  Treated with cryotherapy today; written and verbal consent obtained     PROCEDURE:  DESTRUCTION OF PRE-MALIGNANT LESIONS  After a thorough discussion of treatment options and risk/benefits/alternatives (including but not limited " "to local pain, scarring, dyspigmentation, blistering, and possible superinfection), verbal and written consent were obtained and the aforementioned lesions were treated on with cryotherapy using liquid nitrogen x 2 cycles for 5-10 seconds. The patient tolerated the procedure well, and after-care instructions were provided.     TOTAL NUMBER of 3 pre-malignant lesions were treated today on the ANATOMIC LOCATION: Mid upper back, left cheek(2).       Patient instructions:  Your pre-cancerous lesions (called actinic keratosis) were treated with liquid nitrogen today. The treated areas will get more red, crusted over the next few days. There might be some blistering. Apply vaseline to the treated area for the next week to help it heal fully. Do not pick at the area. Return in 3-4 weeks for another round of liquid nitrogen treatment if lesion(s)  fails to fully resolve.         NEOPLASM OF UNCERTAIN BEHAVIOR OF SKIN    Physical Exam:  (Anatomic Location); (Size and Morphological Description); (Differential Diagnosis):  Specimen A: Right mid back: pearly papaule DDx BCC   Specimen B: left neck: pink brown macule DDx MM vs Atypical Nevus       Additional History of Present Condition:  Present on exam     Assessment and Plan:  I have discussed with the patient that a sample of skin via a \"skin biopsy” would be potentially helpful to further make a specific diagnosis under the microscope.  Based on a thorough discussion of this condition and the management approach to it (including a comprehensive discussion of the known risks, side effects and potential benefits of treatment), the patient (family) agrees to implement the following specific plan:    Procedure:  Skin Biopsy.  After a thorough discussion of treatment options and risk/benefits/alternatives (including but not limited to local pain, scarring, dyspigmentation, blistering, possible superinfection, and inability to confirm a diagnosis via histopathology), verbal and " "written consent were obtained and portion of the rash was biopsied for tissue sample.  See below for consent that was obtained from patient and subsequent Procedure Note.     PROCEDURE TANGENTIAL (SHAVE) BIOPSY NOTE:    Performing Physician:   Anatomic Location; Clinical Description with size (cm); Pre-Op Diagnosis:   Specimen A: Right mid back: pearly papaule DDxc BCC   Specimen B: left neck: pink brown macule DDx MM vs Atypical Nevus   Post-op diagnosis: Same     Local anesthesia: 1% xylocaine with epi      Topical anesthesia: None    Hemostasis: Aluminum chloride       After obtaining informed consent  at which time there was a discussion about the purpose of biopsy  and low risks of infection and bleeding.  The area was prepped and draped in the usual fashion. Anesthesia was obtained with 1% lidocaine with epinephrine. A shave biopsy to an appropriate sampling depth was obtained by Shave (Dermablade or 15 blade) The resulting wound was covered with surgical ointment and bandaged appropriately.     The patient tolerated the procedure well without complications and was without signs of functional compromise.      Specimen has been sent for review by Dermatopathology.    Standard post-procedure care has been explained and has been included in written form within the patient's copy of Informed Consent.    INFORMED CONSENT DISCUSSION AND POST-OPERATIVE INSTRUCTIONS FOR PATIENT    I.  RATIONALE FOR PROCEDURE  I understand that a skin biopsy allows the Dermatologist to test a lesion or rash under the microscope to obtain a diagnosis.  It usually involves numbing the area with numbing medication and removing a small piece of skin; sometimes the area will be closed with sutures. In this specific procedure, sutures are not usually needed.  If any sutures are placed, then they are usually need to be removed in 2 weeks or less.    I understand that my Dermatologist recommends that a skin \"shave\" biopsy be performed " "today.  A local anesthetic, similar to the kind that a dentist uses when filling a cavity, will be injected with a very small needle into the skin area to be sampled.  The injected skin and tissue underneath \"will go to sleep” and become numb so no pain should be felt afterwards.  An instrument shaped like a tiny \"razor blade\" (shave biopsy instrument) will be used to cut a small piece of tissue and skin from the area so that a sample of tissue can be taken and examined more closely under the microscope.  A slight amount of bleeding will occur, but it will be stopped with direct pressure and a pressure bandage and any other appropriate methods.  I understands that a scar will form where the wound was created.  Surgical ointment will be applied to help protect the wound.  Sutures are not usually needed.    II.  RISKS AND POTENTIAL COMPLICATIONS   I understand the risks and potential complications of a skin biopsy include but are not limited to the following:  Bleeding  Infection  Pain  Scar/keloid  Skin discoloration  Incomplete Removal  Recurrence  Nerve Damage/Numbness/Loss of Function  Allergic Reaction to Anesthesia  Biopsies are diagnostic procedures and based on findings additional treatment or evaluation may be required  Loss or destruction of specimen resulting in no additional findings    My Dermatologist has explained to me the nature of the condition, the nature of the procedure, and the benefits to be reasonably expected compared with alternative approaches.  My Dermatologist has discussed the likelihood of major risks or complications of this procedure including the specific risks listed above, such as bleeding, infection, and scarring/keloid.  I understand that a scar is expected after this procedure.  I understand that my physician cannot predict if the scar will form a \"keloid,\" which extends beyond the borders of the wound that is created.  A keloid is a thick, painful, and bumpy scar.  A keloid can " "be difficult to treat, as it does not always respond well to therapy, which includes injecting cortisone directly into the keloid every few weeks.  While this usually reduces the pain and size of the scar, it does not eliminate it.      I understand that photographs may be taken before and after the procedure.  These will be maintained as part of the medical providers confidential records and may not be made available to me.  I further authorize the medical provider to use the photographs for teaching purposes or to illustrate scientific papers, books, or lectures if in his/her judgment, medical research, education, or science may benefit from its use.    I have had an opportunity to fully inquire about the risks and benefits of this procedure and its alternatives.   I have been given ample time and opportunity to ask questions and to seek a second opinion if I wished to do so.  I acknowledge that there have specifically been no guarantees as to the cosmetic results from the procedure.  I am aware that with any procedure there is always the possibility of an unexpected complication.    III. POST-PROCEDURAL CARE (WHAT YOU WILL NEED TO DO \"AFTER THE BIOPSY\" TO OPTIMIZE HEALING)    Keep the area clean and dry.  Try NOT to remove the bandage or get it wet for the first 24 hours.    Gently clean the area and apply surgical ointment (such as Vaseline petrolatum ointment, which is available \"over the counter\" and not a prescription) to the biopsy site for up to 2 weeks straight.  This acts to protect the wound from the outside world.      Sutures are not usually placed in this procedure.  If any sutures were placed, return for suture removal as instructed (generally 1 week for the face, 2 weeks for the body).      Take Acetaminophen (Tylenol) for discomfort, if no contraindications.  Ibuprofen or aspirin could make bleeding worse.    Call our office immediately for signs of infection: fever, chills, increased redness, " warmth, tenderness, discomfort/pain, or pus or foul smell coming from the wound.    WHAT TO DO IF THERE IS ANY BLEEDING?  If a small amount of bleeding is noticed, place a clean cloth over the area and apply firm pressure for ten minutes.  Check the wound after 10 minutes of direct pressure.  If bleeding persists, try one more time for an additional 10 minutes of direct pressure on the area.  If the bleeding becomes heavier or does not stop after the second attempt, or if you have any other questions about this procedure, then please call your [facility] Dermatologist by calling [phone] (SKIN).     I hereby acknowledge that I have reviewed and verified the site with my Dermatologist and have requested and authorized my Dermatologist to proceed with the procedure.    Pedro James MD  PGY-III Dermatology Resident          Scribe Attestation      I,:  Hilario Varela am acting as a scribe while in the presence of the attending physician.:       I,:  Madi Funez MD personally performed the services described in this documentation    as scribed in my presence.:

## 2025-04-15 ENCOUNTER — RESULTS FOLLOW-UP (OUTPATIENT)
Dept: DERMATOLOGY | Facility: CLINIC | Age: 60
End: 2025-04-15

## 2025-04-15 NOTE — RESULT ENCOUNTER NOTE
DERMATOPATHOLOGY RESULT NOTE    Results reviewed by ordering physician.  Called patient to personally discuss results. Discussed results with patient.       Instructions for Clinical Derm Team:   (remember to route Result Note to appropriate staff):    Call patient and schedule for excision    Result & Plan by Specimen:    Specimen A: malignant  Plan: excision      Specimen B: benign  Plan: reassured, benign      Status: Final result      Dx: Neoplasm of uncertain behavior of skin    Test Result Released: Yes (seen)    View Follow-Up Encounter     Component  Ref Range & Units (hover)   Case Report  Surgical Pathology Report                         Case: [id number]                                  Authorizing Provider:  Pedro James MD            Collected:           04/08/2025 1644              Ordering Location:     [facility]      Received:            04/08/2025 1644                                     [place]                                                                      Pathologist:           Ramón Bañuelos MD                                                          Specimens:   A) - Skin, Other, Specimen A: right mid back                                                       B) - Skin, Other, specimen B : left neck                                                Final Diagnosis  A. Skin, right mid back, shave biopsy:    BASAL CELL CARCINOMA (SUPERFICIAL AND NODULAR TYPE); transected (see note).    Note: The lesional cells are positive for Crescencio-EP4 immunostain, while they are negative for SOX10, CK20, and synaptophysin immunostains. These findings support the diagnosis.        B. Skin, left neck, shave biopsy:    LENTIGO (see note).    Note: SOX10, MART-1, and PRAME immunostains were reviewed; significant melanocytic architectural disorder is not seen, and the lesional cells are negative for PRAME. Multiple levels examined.      Electronically signed by Ramón Bañuelos MD on 4/11/2025 at 1631  "EDT  Additional Information   All reported additional testing was performed with appropriately reactive controls.  These tests were developed and their performance characteristics determined by [facility] or appropriate performing facility, though some tests may be performed on tissues which have not been validated for performance characteristics (such as staining performed on alcohol exposed cell blocks and decalcified tissues).  Results should be interpreted with caution and in the context of the patients' clinical condition. These tests may not be cleared or approved by the U.[place]. Food and Drug Administration, though the FDA has determined that such clearance or approval is not necessary. These tests are used for clinical purposes and they should not be regarded as investigational or for research. This laboratory has been approved by [facility], designated as a high-complexity laboratory and is qualified to perform these tests.  .  Gross Description   A. The specimen is received in formalin, labeled with the patient's name and hospital number, and is designated \" right mid back.\"  It consists of 2 irregular fragments of white-tan skin, measuring 0.6 x 0.6 cm and 0.6 x 0.5 cm, each less than 0.1 cm thick.  The probable margin of each fragment is inked green and the probable skin surfaces are inked red.  Each fragment is bisected and the specimen is submitted entirely in 1 cassette, between sponges.  B. The specimen is received in formalin, labeled with the patient's name and hospital number, and is designated \" left neck.\"  It consists of a 0.7 x 0.5 cm fragment of skin, less than 0.1 cm thick.  The skin is white to tan and wrinkled.  The margin is inked green and the skin surface is inked red.  The specimen is bisected and submitted entirely in 1 cassette, between sponges.    Note: The estimated total formalin fixation time based upon information provided by the submitting clinician and the " standard processing schedule is under 72 hours.    MScheib  Clinical Information   ATTENTION:  DERMPATH GROUP    SPECIMEN A; Skin; Anatomic Location: right mid back ; Procedure/Protocol: Skin Specimen (submit in FORMALIN):Tangential Biopsy (includes shave, scoop, saucerization, curette) (CPT 39088; each additional tangential biopsy is CPT 86383)  60 y.o. year old  Male with a Morphological Description:pearly papule  Differential Diagnosis and/or Specific Clinical Question:DDx; BCC      ATTENTION:  DERMPATH GROUP    SPECIMEN B; Skin; Anatomic Location: left neck ; Procedure/Protocol: Skin Specimen (submit in FORMALIN):Tangential Biopsy (includes shave, scoop, saucerization, curette) (CPT 69508; each additional tangential biopsy is CPT 32622)  60 y.o. year old  Male with a Morphological Description:pink brown macule  Differential Diagnosis and/or Specific Clinical Question:DDx; MM vs atypical nevus  Resulting Agency [id number]          Specimen Collected: 04/08/25  4:44 PM Last Resulted: 04/11/25  4:31 PM

## 2025-05-21 ENCOUNTER — PROCEDURE VISIT (OUTPATIENT)
Dept: DERMATOLOGY | Facility: CLINIC | Age: 60
End: 2025-05-21
Payer: COMMERCIAL

## 2025-05-21 VITALS
WEIGHT: 182.6 LBS | DIASTOLIC BLOOD PRESSURE: 71 MMHG | SYSTOLIC BLOOD PRESSURE: 120 MMHG | BODY MASS INDEX: 28.66 KG/M2 | HEIGHT: 67 IN

## 2025-05-21 DIAGNOSIS — C44.519 BASAL CELL CARCINOMA (BCC) OF BACK: Primary | ICD-10-CM

## 2025-05-21 PROCEDURE — 17261 DSTRJ MAL LES T/A/L .6-1.0CM: CPT | Performed by: DERMATOLOGY

## 2025-05-21 NOTE — PROGRESS NOTES
"[facility] Note     Patient Name: Jomar Baugh  Encounter Date: 5/21/2025       Have you been cared for by a [facility] in the last 3 years and, if so, which description applies to you? Yes. I have been here within the last 3 years, and my medical history has NOT changed since that time. I am not of child-bearing potential.     REVIEW OF SYSTEMS:  Have you recently had or currently have any of the following? No changes in my recent health.   PAST MEDICAL HISTORY:  Have you personally ever had or currently have any of the following?  If \"YES,\" then please provide more detail. No changes in my medical history.   HISTORY OF IMMUNOSUPPRESSION: Do you have a history of any of the following:  Systemic Immunosuppression such as Diabetes, Biologic or Immunotherapy, Chemotherapy, Organ Transplantation, Bone Marrow Transplantation or Prednisone?  No     Answering \"YES\" requires the addition of the dotphrase \"IMMUNOSUPPRESSED\" as the first diagnosis of the patient's visit.   FAMILY HISTORY:  Any \"first degree relatives\" (parent, brother, sister, or child) with the following?    No changes in my family's known health.   PATIENT EXPERIENCE:    Do you want the Dermatologist to perform a COMPLETE skin exam today including a clinical examination under the \"bra and underwear\" areas?  NO  If necessary, do we have your permission to call and leave a detailed message on your Preferred Phone number that includes your specific medical information?  Yes      Allergies[1] Current Medications[2]              Whom besides the patient is providing clinical information about today's encounter?   NO ADDITIONAL HISTORIAN (patient alone provided history)    Physical Exam and Assessment/Plan by Diagnosis:        CURETTAGE AND DESICCATION Malignant and Premalignant Lesion    Diagnosis: Basal Cell Carcinoma ( Superficial and Nodular )  Prior biopsy: Yes  Access Number: [id number]  Location  right mid back   Size " preop 0.6 cm  Size postop 0.8 cm    Additional History of Present Condition:  Patient is present for a ED&C of a BCC located on the right mid back; prior biopsy [id number] performed on 04/08/2025 by Dr. Funez.     Assessment and Plan:  Based on a thorough discussion of this condition and the management approach to it (including a comprehensive discussion of the known risks, side effects and potential benefits of treatment), the patient (family) agrees to treat the above described lesion with desiccation and curettage.    Procedure:  The area was cleanly  prepped in usual manner  Anesthesia:3:1 1% xylocaine with epi and 1-100,000 buffered   The above described lesion was aggressively curetted to mid dermis followed by desiccation  Number of cycles of desiccation and curettage 3  A clean dry dressing was placed on site. Oral and written postop care instructions were discussed and reviewed      DISCUSSION OF TREATMENT AND POSTOP CARE FOR PATIENT    What is curettage and desiccation?  Curettage and desiccation is a type of electrosurgery in which a skin lesion is scraped off and heat is applied to the skin surface.    What is involved in curettage and cautery?  Your doctor will explain to you why your skin lesion needs treatment and the procedure involved. You may have to sign a consent form to indicate that you consent to the surgical procedure. Tell your doctor if you are taking any medication, or if you have any allergies or medical conditions.  The doctor will inject some local anaesthetic into the area surrounding the lesion to be treated. This will make the skin go numb so no pain should be felt during the procedure. You may feel a pushing sensation but this should not be painful. The skin lesion is scraped off with a curette, which is like a small spoon with very sharp edges.  The lesion should be sent to a pathology laboratory for analysis. The wound surface is then cauterised with a hot wire beaded tip or  electrosurgical unit (diathermy). This stops bleeding and helps destroys any remaining skin tumour cells.  This procedure is usually repeated twice for malignant skin lesions (serial curettage and cautery).  A dressing may be applied and instructions should be given on how to care for your wound.    What types of skin lesions can be treated by curettage?  Curettage is suitable to treat lesions where the material being scraped off is softer than the surrounding skin or when there is a natural cleavage plane between the lesion and the surrounding normal tissue. The following are sometimes treated by curettage:  Squamous cell carcinoma in situ   Actinic keratoses   Basal cell carcinomas that are large, deep or recurrent are usually not suitable for curettage. Lesions with poorly defined edges are also generally unsuitable.Curettage and desiccation is most often used in more superficial type basal cell carcinoma.    Will I have a scar?  Curettage often results in some sort of scar especially if accompanied by cautery.   The scars from curettage are usually flat and round. They are a similar size to that of the original skin lesion. Some people have an abnormal response to skin healing and these people may get larger scars than usual (keloids and hypertrophic scarring).    How do I look after the wound following skin curettage?  The wound may be tender when the local anaesthetic wears off.  Leave the dressing in place till the nest day. Avoid strenuous exertion and stretching of the area.   If there is any bleeding, press on the wound firmly with a folded towel without looking at it for 30 minutes. If it is still bleeding after this time, seek medical attention.  Follow instructions a written in our consent ,  The wound from curettage will take approximately 2-3 weeks to heal over. The scar will initially be red and raised but usually reduces in colour and size over several months.  .  BCC treated with Ed/c after  detailed discussion of treatment options.  Well tolerated.  Bandage applied and wound care discussed.      Scribe Attestation      I,:  Patricia Faustin MA am acting as a scribe while in the presence of the attending physician.:       I,:  Serafin Renee MD personally performed the services described in this documentation    as scribed in my presence.:                   [1] No Known Allergies  [2]   Current Outpatient Medications:     multivitamin (THERAGRAN) TABS, Take 1 tablet by mouth in the morning., Disp: , Rfl:

## 2025-08-11 ENCOUNTER — OFFICE VISIT (OUTPATIENT)
Dept: INTERNAL MEDICINE CLINIC | Age: 60
End: 2025-08-11
Payer: COMMERCIAL

## 2025-08-14 ENCOUNTER — APPOINTMENT (OUTPATIENT)
Dept: LAB | Facility: AMBULARY SURGERY CENTER | Age: 60
End: 2025-08-14
Payer: COMMERCIAL